# Patient Record
Sex: FEMALE | Race: ASIAN | URBAN - METROPOLITAN AREA
[De-identification: names, ages, dates, MRNs, and addresses within clinical notes are randomized per-mention and may not be internally consistent; named-entity substitution may affect disease eponyms.]

---

## 2017-01-17 ENCOUNTER — HOSPITAL ENCOUNTER (OUTPATIENT)
Dept: LAB | Age: 57
Discharge: HOME | End: 2017-01-17
Payer: OTHER GOVERNMENT

## 2017-01-17 ENCOUNTER — OFFICE VISIT (OUTPATIENT)
Dept: OBGYN CLINIC | Age: 57
End: 2017-01-17

## 2017-01-17 VITALS
DIASTOLIC BLOOD PRESSURE: 70 MMHG | WEIGHT: 123 LBS | HEIGHT: 62 IN | SYSTOLIC BLOOD PRESSURE: 123 MMHG | HEART RATE: 78 BPM | BODY MASS INDEX: 22.63 KG/M2

## 2017-01-17 DIAGNOSIS — Z11.3 ENCOUNTER FOR SCREENING EXAMINATION FOR SEXUALLY TRANSMITTED DISEASE: ICD-10-CM

## 2017-01-17 DIAGNOSIS — Z01.419 WELL WOMAN EXAM: Primary | ICD-10-CM

## 2017-01-17 DIAGNOSIS — Z23 NEED FOR DIPHTHERIA-TETANUS-PERTUSSIS (TDAP) VACCINE: ICD-10-CM

## 2017-01-17 DIAGNOSIS — Z01.419 WELL WOMAN EXAM: ICD-10-CM

## 2017-01-17 DIAGNOSIS — N89.8 VAGINAL DRYNESS: ICD-10-CM

## 2017-01-17 DIAGNOSIS — Z23 ENCOUNTER FOR IMMUNIZATION: ICD-10-CM

## 2017-01-17 DIAGNOSIS — N60.99 ATYPICAL HYPERPLASIA OF BREAST: ICD-10-CM

## 2017-01-17 PROCEDURE — 87389 HIV-1 AG W/HIV-1&-2 AB AG IA: CPT | Performed by: OBSTETRICS & GYNECOLOGY

## 2017-01-17 RX ORDER — AMLODIPINE BESYLATE 2.5 MG/1
2.5 TABLET ORAL DAILY
COMMUNITY

## 2017-01-17 RX ORDER — ROSUVASTATIN CALCIUM 5 MG/1
5 TABLET, COATED ORAL DAILY
COMMUNITY

## 2017-01-17 NOTE — PROGRESS NOTES
Name: Tia Osman MRN: [id number]    YOB: 1960  Age: 55 y.o. Sex: female        Chief Complaint   Patient presents with    Well Woman       HPI  Denies depression  Does eat an ok balanced diet  Does exercise, 30 mins per day  Denies domestic abuse  Last tdap over 10 years ago, thinks in   Flu vaccine done  Mammogram 2016, scheduled for follow up   Colonoscopy scheduled later this month or February    OB History      Para Term  AB TAB SAB Ectopic Multiple Living    2 2 2  0  0   1        Obstetric Comments    Term stillborn    Menopause at 47  No h/o of STIs  Last pap 2016, neg, HR HPV neg             History   Sexual Activity    Sexual activity: Yes    Partners: Male       Past Medical History   Diagnosis Date    Atypical hyperplasia of breast      lump removed from R breast , lump removed from L breast , both atypical hyperplasia    Hypercholesterolemia     Hypertension        Past Surgical History   Procedure Laterality Date    Hx breast biopsy       right and left breast       Allergies   Allergen Reactions    Pcn [Penicillins] Rash       Current Outpatient Prescriptions on File Prior to Visit   Medication Sig Dispense Refill    raloxifene (EVISTA) 60 mg tablet Take  by mouth daily.  PSYLLIUM HUSK/CALCIUM CARB (METAMUCIL PLUS CALCIUM PO) Take  by mouth.  calcium-cholecalciferol, d3, 600-125 mg-unit tab Take  by mouth.  PV W-O JAEL/FERROUS FUMARATE/FA (M-VIT PO) Take  by mouth.  ascorbic acid (VITAMIN C) 500 mg tablet Take  by mouth.  Omega-3-DHA-EPA-Fish Oil 1,000 mg (120 mg-180 mg) cap Take  by mouth. No current facility-administered medications on file prior to visit. Norvasc   Crestor      Social History     Social History    Marital status:      Spouse name: N/A    Number of children: N/A    Years of education: N/A     Occupational History    Not on file.      Social History Main Topics    Smoking status: Former Smoker    Smokeless tobacco: Former User     Quit date: 1/12/1985    Alcohol use No    Drug use: No    Sexual activity: Yes     Partners: Male     Other Topics Concern    Not on file     Social History Narrative       Family History   Problem Relation Age of Onset    Cancer Father      lung    Diabetes Father     Stroke Father     Hypertension Father     Cancer Paternal Grandfather      stomach    Cancer Maternal Aunt      breast cancer in both breasts    Cancer Other      mat cousin breast cancer    Stroke Mother        Review of Systems   Constitutional: Negative. HENT: Negative. Respiratory: Positive for shortness of breath (did PFTs which were normal). Negative for cough. Cardiovascular: Negative. Gastrointestinal: Positive for constipation (extra fiber). Negative for abdominal pain, diarrhea, nausea and vomiting. Genitourinary: Negative. Musculoskeletal: Positive for back pain (seasonal arthritis, only notes with weather changes). Negative for neck pain. Skin: Negative. Neurological: Negative. Visit Vitals    /70 (BP 1 Location: Left arm, BP Patient Position: Sitting)    Pulse 78    Ht 5' 2\" (1.575 m)    Wt 123 lb (55.8 kg)    BMI 22.5 kg/m2       GENERAL:  Well developed, well nourished, in no distress  NEURO/PSYCHE: Grossly intact, normal mood and affect  HEENT: Normal cephalic, atraumatic, good dentition, neck supple.  No thyromegaly  BREASTS: breasts appear normal, no suspicious masses, no skin or nipple changes, scars noted from biopsies   CV: regular rate and rhythm  LUNGS: clear to auscultation bilaterally, no wheezes, rhonchi or rales, good air entry with normal effort  ABDOMEN: + BS, soft without tenderness, no guarding, rebound or masses  EXTREMITIES: no edema or erythema noted  SKIN:  Warm, dry, no lesions  LYMPHATICS: No supraclavicular, axillary or inguinal nodes noted    PELVIC EXAM:  LABIA MAJORA: no masses, tenderness or lesions  LABIA MINORA: no masses, tenderness or lesions  CLITORIS: no masses, tenderness or lesions  URETHRA: normal appearing, no masses or tenderness  BLADDER: no fullness or tenderness  VAGINA: pale pink appearing vagina with physiologic discharge, no lesions   PERINEUM: no masses, tenderness or lesions  CERVIX: No CMT or lesions  UTERUS: small, mobile, nontender  ADNEXA: nontender and no masses    1. Well woman exam  Reviewed diet, weight, exercise, and domestic abuse. Mammogram already ordered, colonoscopy getting scheduled. Reviewed tetanus and flu vaccines. All of her questions were discussed. - HIV 1/2 AG/AB, 4TH GENERATION,W RFLX CONFIRM; Future    2. Vaginal dryness  No estrogen due to her breast problems, discussed using buddy's vaginal suppositories as well as other lubricants, all of her questions were answered. 3. Atypical hyperplasia of breast  Has follow up, seeing her oncologist later this month    4. Encounter for screening examination for sexually transmitted disease  Would like testing due to recent surgery    - HIV 1/2 AG/AB, 4TH GENERATION,W RFLX CONFIRM; Future    5.  Need for tetanus vaccine  Will give tetanus today    F/U PRN

## 2017-01-17 NOTE — PATIENT INSTRUCTIONS
Well Visit, Women 48 to 72: Care Instructions  Your Care Instructions  Physical exams can help you stay healthy. Your doctor has checked your overall health and may have suggested ways to take good care of yourself. He or she also may have recommended tests. At home, you can help prevent illness with healthy eating, regular exercise, and other steps. Follow-up care is a key part of your treatment and safety. Be sure to make and go to all appointments, and call your doctor if you are having problems. It's also a good idea to know your test results and keep a list of the medicines you take. How can you care for yourself at home? · Reach and stay at a healthy weight. This will lower your risk for many problems, such as obesity, diabetes, heart disease, and high blood pressure. · Get at least 30 minutes of exercise on most days of the week. Walking is a good choice. You also may want to do other activities, such as running, swimming, cycling, or playing tennis or team sports. · Do not smoke. Smoking can make health problems worse. If you need help quitting, talk to your doctor about stop-smoking programs and medicines. These can increase your chances of quitting for good. · Protect your skin from too much sun. When you're outdoors from 10 a.m. to 4 p.m., stay in the shade or cover up with clothing and a hat with a wide brim. Wear sunglasses that block UV rays. Even when it's cloudy, put broad-spectrum sunscreen (SPF 30 or higher) on any exposed skin. · See a dentist one or two times a year for checkups and to have your teeth cleaned. · Wear a seat belt in the car. · Limit alcohol to 1 drink a day. Too much alcohol can cause health problems. Follow your doctor's advice about when to have certain tests. These tests can spot problems early. · Cholesterol.  Your doctor will tell you how often to have this done based on your age, family history, or other things that can increase your risk for heart attack and stroke. · Blood pressure. Have your blood pressure checked during a routine doctor visit. Your doctor will tell you how often to check your blood pressure based on your age, your blood pressure results, and other factors. · Mammogram. Ask your doctor how often you should have a mammogram, which is an X-ray of your breasts. A mammogram can spot breast cancer before it can be felt and when it is easiest to treat. · Pap test and pelvic exam. Ask your doctor how often you should have a Pap test. You may not need to have a Pap test as often as you used to. · Vision. Have your eyes checked every year or two or as often as your doctor suggests. Some experts recommend that you have yearly exams for glaucoma and other age-related eye problems starting at age 48. · Hearing. Tell your doctor if you notice any change in your hearing. You can have tests to find out how well you hear. · Diabetes. Ask your doctor whether you should have tests for diabetes. · Colon cancer. You should begin tests for colon cancer at age 48. You may have one of several tests. Your doctor will tell you how often to have tests based on your age and risk. Risks include whether you already had a precancerous polyp removed from your colon or whether your parents, sisters and brothers, or children have had colon cancer. · Thyroid disease. Talk to your doctor about whether to have your thyroid checked as part of a regular physical exam. Women have an increased chance of a thyroid problem. · Osteoporosis. You should begin tests for bone density at age 72. If you are younger than 72, ask your doctor whether you have factors that may increase your risk for this disease. You may want to have this test before age 72. · Heart attack and stroke risk. At least every 4 to 6 years, you should have your risk for heart attack and stroke assessed.  Your doctor uses factors such as your age, blood pressure, cholesterol, and whether you smoke or have diabetes to show what your risk for a heart attack or stroke is over the next 10 years. When should you call for help? Watch closely for changes in your health, and be sure to contact your doctor if you have any problems or symptoms that concern you. Where can you learn more? Go to http://bimal-froy.info/. Enter E225 in the search box to learn more about \"Well Visit, Women 50 to 72: Care Instructions. \"  Current as of: July 19, 2016  Content Version: 11.1  © 7958-7481 [employer]. Care instructions adapted under license by [employer] (which disclaims liability or warranty for this information). If you have questions about a medical condition or this instruction, always ask your healthcare professional. [address] any warranty or liability for your use of this information. Td (Tetanus, Diphtheria) Vaccine: What You Need to Know  Why get vaccinated? Tetanus and diphtheria are very serious diseases. They are rare in the [place] today, but people who do become infected often have severe complications. Td vaccine is used to protect adolescents and adults from both of these diseases. Both diphtheria and tetanus are infections caused by bacteria. Diphtheria spreads from person to person through secretions from coughing or sneezing. Tetanus-causing bacteria enter the body through cuts, scratches, or wounds. TETANUS (lockjaw) causes painful muscle tightening and stiffness, usually all over the body. · It can lead to tightening of muscles in the head and neck so you can't open your mouth, swallow, or sometimes even breathe. Tetanus kills about 1 out of every 10 people who are infected even after receiving the best medical care. DIPHTHERIA can cause a thick coating to form in the back of the throat. · It can lead to breathing problems, heart failure, paralysis, and death.   Before vaccines, as many as 200,000 cases of diphtheria and hundreds of cases of tetanus were reported in the [place] each year. Since vaccination began, reports of cases for both diseases have dropped by about 99%. Td vaccine  Td vaccine can protect adolescents and adults from tetanus and diphtheria. Td is usually given as a booster dose every 10 years, but it can also be given earlier after a severe and dirty wound or burn. Another vaccine, called Tdap, which protects against pertussis in addition to tetanus and diphtheria, is sometimes recommended instead of Td vaccine. Your doctor or the person giving you the vaccine can give you more information. Td may safely be given at the same time as other vaccines. Some people should not get this vaccine  · A person who has ever had a life-threatening allergic reaction after a previous dose of any tetanus- or diphtheria-containing vaccine, OR has a severe allergy to any part of this vaccine, should not get Td vaccine. Tell the person giving the vaccine about any severe allergies. · Talk to your doctor if you:  ¨ Have seizures or another nervous system problem. ¨ Had severe pain or swelling after any vaccine containing diphtheria or tetanus. ¨ Ever had a condition called Guillain Barré Syndrome (GBS). ¨ Aren't feeling well on the day the shot is scheduled. Risks of a vaccine reaction  With any medicine, including vaccines, there is a chance of side effects. These are usually mild and go away on their own. Serious reactions are also possible but are rare. Most people who get Td vaccine do not have any problems with it.   Mild problems, following Td vaccine  (Did not interfere with activities)  · Pain where the shot was given (about 8 people in 10)  · Redness or swelling where the shot was given (about 1 person in 4)  · Mild fever (rare)  · Headache (about 1 person in 4)  · Tiredness (about 1 person in 4)  Moderate problems, following Td vaccine  (Interfered with activities, but did not require medical attention)  · Fever over 102°F (rare)  Severe problems, following Td vaccine  (Unable to perform usual activities; required medical attention)  · Swelling, severe pain, bleeding, and/or redness in the arm where the shot was given (rare)  Problems that could happen after any vaccine:  · People sometimes faint after a medical procedure, including vaccination. Sitting or lying down for about 15 minutes can help prevent fainting, and injuries caused by a fall. Tell your doctor if you feel dizzy or have vision changes or ringing in the ears. · Some people get severe pain in the shoulder and have difficulty moving the arm where a shot was given. This happens very rarely. · Any medication can cause a severe allergic reaction. Such reactions from a vaccine are very rare, estimated at fewer than 1 in a million doses, and would happen within a few minutes to a few hours after the vaccination. As with any medicine, there is a very remote chance of a vaccine causing a serious injury or death. The safety of vaccines is always being monitored. For more information, visit: www.cdc.gov/vaccinesafety. What if there is a serious reaction? What should I look for? · Look for anything that concerns you, such as signs of a severe allergic reaction, very high fever, or unusual behavior. Signs of a severe allergic reaction can include hives, swelling of the face and throat, difficulty breathing, a fast heartbeat, dizziness, and weakness. These would usually start a few minutes to a few hours after the vaccination. What should I do? · If you think it is a severe allergic reaction or other emergency that can't wait, call 9-1-1 or get the person to the nearest hospital. Otherwise, call your doctor. · Afterward, the reaction should be reported to the Vaccine Adverse Event Reporting System (VAERS). Your doctor might file this report, or you can do it yourself through the VAERS web site at www.vaers. hhs.gov, or by calling 9-588.704.8181.   [employer] does not give medical advice. The National Vaccine Injury Compensation Program  The National Vaccine Injury Compensation Program (VICP) is a federal program that was created to compensate people who may have been injured by certain vaccines. Persons who believe they may have been injured by a vaccine can learn about the program and about filing a claim by calling 1-876.387.2822 or visiting the [employer] website at www.[employer] [facility].gov/vaccinecompensation. There is a time limit to file a claim for compensation. How can I learn more? · Ask your doctor. He or she can give you the vaccine package insert or suggest other sources of information. · Call your local or state health department. · Contact the Centers for Disease Control and Prevention (CDC):  ¨ Call 1-985.479.7071 (1-800-CDC-INFO) or  ¨ Visit CDC's website at www.cdc.gov/vaccines  Vaccine Information Statement (Interim)  Td Vaccine  (2/24/2015)  42 AARON Rogel [id number]  Department of Health and Human Services  Centers for Disease Control and Prevention  Many Vaccine Information Statements are available in Welsh and other languages. See www.immunize.org/vis. Muchas hojas de información sobre vacunas están disponibles en español y en otros idiomas. Visite www.immunize.org/vis. Care instructions adapted under license by your healthcare professional. If you have questions about a medical condition or this instruction, always ask your healthcare professional. [place] [address] any warranty or liability for your use of this information. Breast Cancer (BRCA) Gene Testing: Care Instructions  Your Care Instructions  BRCA1 and BRCA2 are genes that help control normal cell growth. Sometimes, people inherit changes in one of these genes. These changes are called mutations. If you inherit a BRCA (say \"BRAH-kuh\") mutation, you have a greater risk of breast or ovarian cancer. You also have a higher risk of breast or ovarian cancer if you have a family history of them. Some women have a family history, but they don't have the BRCA mutation. To find out if you have the BRCA mutation, you can have a blood test. People who have the mutation have a higher risk for these cancers. But not everyone with the mutation gets cancer. Talk to your doctor if:  · You have a close family member who had a positive test for BRCA. · You or a family member has had breast cancer before age 48. · You or a family member has had ovarian cancer at any age. · You had breast cancer in both breasts or in many places in the same breast.  · You or a family member has both breast and ovarian cancer. · You are [place] [address] and someone in your family had breast cancer before age 48 or ovarian cancer at any age. Your doctor may also want you to talk with a genetic counselor. The counselor can help you understand what the results of this test might mean. Follow-up care is a key part of your treatment and safety. Be sure to make and go to all appointments, and call your doctor if you are having problems. It's also a good idea to know your test results and keep a list of the medicines you take. Why should you have BRCA testing? You may feel better if the test shows that you don't have a BRCA mutation. This is called a negative result. If the test shows that you do have a BRCA mutation, it's called a positive result. In this case, you may be able to make some decisions that could reduce your cancer risk. The information may also be helpful to your family and loved ones. What are the risks of BRCA testing? A negative test may give you a false sense of security. So you may not have the regular tests that help find cancer at an early stage. But a negative BRCA test does not mean that you will never have breast or ovarian cancer. A positive test result may cause anxiety or depression. A positive BRCA test does not mean that you will definitely get breast or ovarian cancer.   It's important to think carefully about what the test results could mean for you. A genetic counselor can help you do this. What can you do to reduce the risk of breast cancer? All women have a risk of breast cancer. This risk increases as you get older. There is no known way to prevent breast cancer. But it can usually be cured if it's found early. You can reduce your risk if you take these steps:  · Get familiar with the look and feel of your breasts. This will help you notice any changes. Call your doctor if you notice a change. · Have regular breast exams by your doctor or nurse. Ask your doctor how often you should get them. · Have regular mammograms. A mammogram is a picture of your breast tissue. It can find changes in your breast before you can feel them. Talk to your doctor about when to get this test.  You can also help take care of yourself and reduce your risk of cancer if you:  · Stay at a healthy weight. · Eat a healthy, low-fat diet. · Get some exercise every day. If you don't usually exercise, walking is a good way to start. · Don't smoke. If you need help quitting, talk to your doctor about stop-smoking programs and medicines. These can increase your chances of quitting for good. · Drink alcohol in moderation. Or don't drink alcohol at all. · Breastfeed. There is some evidence that breastfeeding may lower the risk of breast cancer. The benefit seems to be greatest in women who have  for longer than 12 months or who  several children. Where can you learn more? Go to http://bimal-froy.info/. Enter U252 in the search box to learn more about \"Breast Cancer (BRCA) Gene Testing: Care Instructions. \"  Current as of: July 26, 2016  Content Version: 11.1  © 8529-2995 [employer]. Care instructions adapted under license by [employer] (which disclaims liability or warranty for this information).  If you have questions about a medical condition or this instruction, always ask your healthcare professional. [place] [address] any warranty or liability for your use of this information.

## 2017-01-17 NOTE — MR AVS SNAPSHOT
Visit Information Date & Time Provider Department Dept. Phone Encounter #  
 1/17/2017 11:30 AM Delton Lesch, [place] [id number] Follow-up Instructions Return as needed. Upcoming Health Maintenance Date Due  
 BREAST CANCER SCRN MAMMOGRAM 11/20/2011 FOBT Q 1 YEAR AGE 50-75 11/20/2011 INFLUENZA AGE 9 TO ADULT 8/1/2016 PAP AKA CERVICAL CYTOLOGY 1/12/2019 Allergies as of 1/17/2017  Review Complete On: 1/17/2017 By: Delton Lesch, MD  
  
 Severity Noted Reaction Type Reactions Pcn [Penicillins]  01/12/2016    Rash Current Immunizations  Never Reviewed No immunizations on file. Not reviewed this visit You Were Diagnosed With   
  
 Codes Comments Well woman exam    -  Primary ICD-10-CM: Y16.412 ICD-9-CM: V72.31 Vaginal dryness     ICD-10-CM: N89.8 ICD-9-CM: 625.8 Atypical hyperplasia of breast     ICD-10-CM: N62 
ICD-9-CM: 611.1 Encounter for screening examination for sexually transmitted disease     ICD-10-CM: Z11.3 ICD-9-CM: V74.5 Need for diphtheria-tetanus-pertussis (Tdap) vaccine     ICD-10-CM: F62 ICD-9-CM: V06.1 Vitals BP Pulse Height(growth percentile) Weight(growth percentile) BMI OB Status 123/70 (BP 1 Location: Left arm, BP Patient Position: Sitting) 78 5' 2\" (1.575 m) 123 lb (55.8 kg) 22.5 kg/m2 Postmenopausal  
 Smoking Status Former Smoker BMI and BSA Data Body Mass Index Body Surface Area  
 22.5 kg/m 2 1.56 m 2 Your Updated Medication List  
  
   
This list is accurate as of: 1/17/17 11:40 AM.  Always use your most recent med list. amLODIPine 2.5 mg tablet Commonly known as:  Petra Spruce Take 2.5 mg by mouth daily. ascorbic acid (vitamin C) 500 mg tablet Commonly known as:  VITAMIN C Take  by mouth.  
  
 calcium-cholecalciferol (d3) 600-125 mg-unit Tab Take  by mouth. CRESTOR 5 mg tablet Generic drug:  rosuvastatin Take 5 mg by mouth daily. M-VIT PO Take  by mouth. METAMUCIL PLUS CALCIUM PO Take  by mouth. Omega-3-DHA-EPA-Fish Oil 1,000 mg (120 mg-180 mg) Cap Take  by mouth. raloxifene 60 mg tablet Commonly known as:  EVISTA Take  by mouth daily. Follow-up Instructions Return as needed. To-Do List   
 01/17/2017 Lab:  HIV 1/2 AG/AB, 4TH GENERATION,W RFLX CONFIRM Patient Instructions Well Visit, Women 48 to 72: Care Instructions Your Care Instructions Physical exams can help you stay healthy. Your doctor has checked your overall health and may have suggested ways to take good care of yourself. He or she also may have recommended tests. At home, you can help prevent illness with healthy eating, regular exercise, and other steps. Follow-up care is a key part of your treatment and safety. Be sure to make and go to all appointments, and call your doctor if you are having problems. It's also a good idea to know your test results and keep a list of the medicines you take. How can you care for yourself at home? · Reach and stay at a healthy weight. This will lower your risk for many problems, such as obesity, diabetes, heart disease, and high blood pressure. · Get at least 30 minutes of exercise on most days of the week. Walking is a good choice. You also may want to do other activities, such as running, swimming, cycling, or playing tennis or team sports. · Do not smoke. Smoking can make health problems worse. If you need help quitting, talk to your doctor about stop-smoking programs and medicines. These can increase your chances of quitting for good. · Protect your skin from too much sun. When you're outdoors from 10 a.m. to 4 p.m., stay in the shade or cover up with clothing and a hat with a wide brim. Wear sunglasses that block UV rays.  Even when it's cloudy, put broad-spectrum sunscreen (SPF 30 or higher) on any exposed skin. · See a dentist one or two times a year for checkups and to have your teeth cleaned. · Wear a seat belt in the car. · Limit alcohol to 1 drink a day. Too much alcohol can cause health problems. Follow your doctor's advice about when to have certain tests. These tests can spot problems early. · Cholesterol. Your doctor will tell you how often to have this done based on your age, family history, or other things that can increase your risk for heart attack and stroke. · Blood pressure. Have your blood pressure checked during a routine doctor visit. Your doctor will tell you how often to check your blood pressure based on your age, your blood pressure results, and other factors. · Mammogram. Ask your doctor how often you should have a mammogram, which is an X-ray of your breasts. A mammogram can spot breast cancer before it can be felt and when it is easiest to treat. · Pap test and pelvic exam. Ask your doctor how often you should have a Pap test. You may not need to have a Pap test as often as you used to. · Vision. Have your eyes checked every year or two or as often as your doctor suggests. Some experts recommend that you have yearly exams for glaucoma and other age-related eye problems starting at age 48. · Hearing. Tell your doctor if you notice any change in your hearing. You can have tests to find out how well you hear. · Diabetes. Ask your doctor whether you should have tests for diabetes. · Colon cancer. You should begin tests for colon cancer at age 48. You may have one of several tests. Your doctor will tell you how often to have tests based on your age and risk. Risks include whether you already had a precancerous polyp removed from your colon or whether your parents, sisters and brothers, or children have had colon cancer. · Thyroid disease.  Talk to your doctor about whether to have your thyroid checked as part of a regular physical exam. Women have an increased chance of a thyroid problem. · Osteoporosis. You should begin tests for bone density at age 72. If you are younger than 72, ask your doctor whether you have factors that may increase your risk for this disease. You may want to have this test before age 72. · Heart attack and stroke risk. At least every 4 to 6 years, you should have your risk for heart attack and stroke assessed. Your doctor uses factors such as your age, blood pressure, cholesterol, and whether you smoke or have diabetes to show what your risk for a heart attack or stroke is over the next 10 years. When should you call for help? Watch closely for changes in your health, and be sure to contact your doctor if you have any problems or symptoms that concern you. Where can you learn more? Go to http://bimal-froy.info/. Enter F792 in the search box to learn more about \"Well Visit, Women 50 to 72: Care Instructions. \" Current as of: July 19, 2016 Content Version: 11.1 © 4118-4166 [employer]. Care instructions adapted under license by [employer] (which disclaims liability or warranty for this information). If you have questions about a medical condition or this instruction, always ask your healthcare professional. [place] [address] any warranty or liability for your use of this information. Td (Tetanus, Diphtheria) Vaccine: What You Need to Know Why get vaccinated? Tetanus and diphtheria are very serious diseases. They are rare in the [place] today, but people who do become infected often have severe complications. Td vaccine is used to protect adolescents and adults from both of these diseases. Both diphtheria and tetanus are infections caused by bacteria.  Diphtheria spreads from person to person through secretions from coughing or sneezing. Tetanus-causing bacteria enter the body through cuts, scratches, or wounds. TETANUS (lockjaw) causes painful muscle tightening and stiffness, usually all over the body. · It can lead to tightening of muscles in the head and neck so you can't open your mouth, swallow, or sometimes even breathe. Tetanus kills about 1 out of every 10 people who are infected even after receiving the best medical care. DIPHTHERIA can cause a thick coating to form in the back of the throat. · It can lead to breathing problems, heart failure, paralysis, and death. Before vaccines, as many as 200,000 cases of diphtheria and hundreds of cases of tetanus were reported in the [place] each year. Since vaccination began, reports of cases for both diseases have dropped by about 99%. Td vaccine Td vaccine can protect adolescents and adults from tetanus and diphtheria. Td is usually given as a booster dose every 10 years, but it can also be given earlier after a severe and dirty wound or burn. Another vaccine, called Tdap, which protects against pertussis in addition to tetanus and diphtheria, is sometimes recommended instead of Td vaccine. Your doctor or the person giving you the vaccine can give you more information. Td may safely be given at the same time as other vaccines. Some people should not get this vaccine · A person who has ever had a life-threatening allergic reaction after a previous dose of any tetanus- or diphtheria-containing vaccine, OR has a severe allergy to any part of this vaccine, should not get Td vaccine. Tell the person giving the vaccine about any severe allergies. · Talk to your doctor if you: 
¨ Have seizures or another nervous system problem. ¨ Had severe pain or swelling after any vaccine containing diphtheria or tetanus. ¨ Ever had a condition called Guillain Barré Syndrome (GBS). ¨ Aren't feeling well on the day the shot is scheduled. Risks of a vaccine reaction With any medicine, including vaccines, there is a chance of side effects. These are usually mild and go away on their own. Serious reactions are also possible but are rare. Most people who get Td vaccine do not have any problems with it. Mild problems, following Td vaccine 
(Did not interfere with activities) · Pain where the shot was given (about 8 people in 10) · Redness or swelling where the shot was given (about 1 person in 4) · Mild fever (rare) · Headache (about 1 person in 4) · Tiredness (about 1 person in 4) Moderate problems, following Td vaccine (Interfered with activities, but did not require medical attention) · Fever over 102°F (rare) Severe problems, following Td vaccine 
(Unable to perform usual activities; required medical attention) · Swelling, severe pain, bleeding, and/or redness in the arm where the shot was given (rare) Problems that could happen after any vaccine: · People sometimes faint after a medical procedure, including vaccination. Sitting or lying down for about 15 minutes can help prevent fainting, and injuries caused by a fall. Tell your doctor if you feel dizzy or have vision changes or ringing in the ears. · Some people get severe pain in the shoulder and have difficulty moving the arm where a shot was given. This happens very rarely. · Any medication can cause a severe allergic reaction. Such reactions from a vaccine are very rare, estimated at fewer than 1 in a million doses, and would happen within a few minutes to a few hours after the vaccination. As with any medicine, there is a very remote chance of a vaccine causing a serious injury or death. The safety of vaccines is always being monitored. For more information, visit: www.cdc.gov/vaccinesafety. What if there is a serious reaction? What should I look for? · Look for anything that concerns you, such as signs of a severe allergic reaction, very high fever, or unusual behavior. Signs of a severe allergic reaction can include hives, swelling of the face and throat, difficulty breathing, a fast heartbeat, dizziness, and weakness. These would usually start a few minutes to a few hours after the vaccination. What should I do? · If you think it is a severe allergic reaction or other emergency that can't wait, call 9-1-1 or get the person to the nearest hospital. Otherwise, call your doctor. · Afterward, the reaction should be reported to the Vaccine Adverse Event Reporting System (VAERS). Your doctor might file this report, or you can do it yourself through the VAERS web site at www.vaers. [facility].gov, or by calling 9-278.854.7016. VAERS does not give medical advice. The National Vaccine Injury Compensation Program 
The National Vaccine Injury Compensation Program (VICP) is a federal program that was created to compensate people who may have been injured by certain vaccines. Persons who believe they may have been injured by a vaccine can learn about the program and about filing a claim by calling 6-217.691.6500 or visiting the [facility] website at www.[facility].gov/vaccinecompensation. There is a time limit to file a claim for compensation. How can I learn more? · Ask your doctor. He or she can give you the vaccine package insert or suggest other sources of information. · Call your local or state health department. · Contact the Centers for Disease Control and Prevention (CDC): 
¨ Call 2-715.934.6346 (1-800-CDC-INFO) or ¨ Visit CDC's website at www.cdc.gov/vaccines Vaccine Information Statement (Interim) Td Vaccine 
(2/24/2015) 42 AARON HayTrell Romeok 791OV-12 Department of Health and [place] Centers for Disease Control and Prevention Many Vaccine Information Statements are available in [place] and other languages. See www.immunize.org/vis. Muchas hojas de información sobre vacunas están disponibles en español y en otros idiomas. Visite www.immunize.org/vis. Care instructions adapted under license by your healthcare professional. If you have questions about a medical condition or this instruction, always ask your healthcare professional. [address] any warranty or liability for your use of this information. Breast Cancer (BRCA) Gene Testing: Care Instructions Your Care Instructions BRCA1 and BRCA2 are genes that help control normal cell growth. Sometimes, people inherit changes in one of these genes. These changes are called mutations. If you inherit a BRCA (say \"BRAH-kuh\") mutation, you have a greater risk of breast or ovarian cancer. You also have a higher risk of breast or ovarian cancer if you have a family history of them. Some women have a family history, but they don't have the BRCA mutation. To find out if you have the BRCA mutation, you can have a blood test. People who have the mutation have a higher risk for these cancers. But not everyone with the mutation gets cancer. Talk to your doctor if: 
· You have a close family member who had a positive test for BRCA. · You or a family member has had breast cancer before age 48. · You or a family member has had ovarian cancer at any age. · You had breast cancer in both breasts or in many places in the same breast. 
· You or a family member has both breast and ovarian cancer. · You are of [place] [address] and someone in your family had breast cancer before age 48 or ovarian cancer at any age. Your doctor may also want you to talk with a genetic counselor. The counselor can help you understand what the results of this test might mean. Follow-up care is a key part of your treatment and safety. Be sure to make and go to all appointments, and call your doctor if you are having problems. It's also a good idea to know your test results and keep a list of the medicines you take. Why should you have BRCA testing? You may feel better if the test shows that you don't have a BRCA mutation. This is called a negative result. If the test shows that you do have a BRCA mutation, it's called a positive result. In this case, you may be able to make some decisions that could reduce your cancer risk. The information may also be helpful to your family and loved ones. What are the risks of BRCA testing? A negative test may give you a false sense of security. So you may not have the regular tests that help find cancer at an early stage. But a negative BRCA test does not mean that you will never have breast or ovarian cancer. A positive test result may cause anxiety or depression. A positive BRCA test does not mean that you will definitely get breast or ovarian cancer. It's important to think carefully about what the test results could mean for you. A genetic counselor can help you do this. What can you do to reduce the risk of breast cancer? All women have a risk of breast cancer. This risk increases as you get older. There is no known way to prevent breast cancer. But it can usually be cured if it's found early. You can reduce your risk if you take these steps: · Get familiar with the look and feel of your breasts. This will help you notice any changes. Call your doctor if you notice a change. · Have regular breast exams by your doctor or nurse. Ask your doctor how often you should get them. · Have regular mammograms. A mammogram is a picture of your breast tissue. It can find changes in your breast before you can feel them. Talk to your doctor about when to get this test. 
You can also help take care of yourself and reduce your risk of cancer if you: 
· Stay at a healthy weight. · Eat a healthy, low-fat diet. · Get some exercise every day. If you don't usually exercise, walking is a good way to start. · Don't smoke.  If you need help quitting, talk to your doctor about stop-smoking programs and medicines. These can increase your chances of quitting for good. · Drink alcohol in moderation. Or don't drink alcohol at all. · Breastfeed. There is some evidence that breastfeeding may lower the risk of breast cancer. The benefit seems to be greatest in women who have  for longer than 12 months or who  several children. Where can you learn more? Go to http://bimal-froy.info/. Enter U252 in the search box to learn more about \"Breast Cancer (BRCA) Gene Testing: Care Instructions. \" Current as of: July 26, 2016 Content Version: 11.1 © 9554-4440 [employer]. Care instructions adapted under license by [employer] (which disclaims liability or warranty for this information). If you have questions about a medical condition or this instruction, always ask your healthcare professional. [address] any warranty or liability for your use of this information. Introducing [facility] HEALTH SERVICES! Dear Suzanne Garcia: Thank you for requesting a [facility] account. Our records indicate that you already have an active [facility] [facility] account. You can access your account anytime at [email or web address] Did you know that you can access your hospital and ER discharge instructions at any time in [facility] [facility]? You can also review all of your test results from your hospital stay or ER visit. Additional Information If you have questions, please visit the Frequently Asked Questions section of the [employer] website at [email or web address] Remember, [facility] is NOT to be used for urgent needs. For medical emergencies, dial 911. Now available from your iPhone and Android! Please provide this summary of care documentation to your next provider. If you have any questions after today's visit, please call [phone].

## 2017-01-18 LAB — HIV 1+2 AB+HIV1 P24 AG SERPL QL IA: NON REACTIVE

## 2017-01-19 ENCOUNTER — TELEPHONE (OUTPATIENT)
Dept: OBGYN CLINIC | Age: 57
End: 2017-01-19

## 2017-01-19 NOTE — TELEPHONE ENCOUNTER
----- Message from Charlee Zuñiga MD sent at 1/18/2017 10:24 AM EST -----  Please let pt know results above are neg, thanks!

## 2018-01-02 ENCOUNTER — IMPORTED ENCOUNTER (OUTPATIENT)
Dept: URBAN - METROPOLITAN AREA CLINIC 1 | Facility: CLINIC | Age: 58
End: 2018-01-02

## 2018-01-02 PROCEDURE — 92015 DETERMINE REFRACTIVE STATE: CPT

## 2018-01-02 PROCEDURE — 92004 COMPRE OPH EXAM NEW PT 1/>: CPT

## 2018-01-02 NOTE — PATIENT DISCUSSION
1.  Blepharitis OD - Daily warm compresses and lid scrubs were recommended. Tobradex ajay to lids OD BID 2. Dry Eyes OU -The use/continuation of artificial tears were recommended. 3.  Return for an appointment in 1 week for 10 with Dr. Gordon Henry.

## 2018-01-03 PROBLEM — H01.001: Noted: 2018-01-03

## 2018-01-03 PROBLEM — H01.004: Noted: 2018-01-03

## 2018-01-03 PROBLEM — H04.123: Noted: 2018-01-03

## 2018-03-08 ENCOUNTER — OFFICE VISIT (OUTPATIENT)
Dept: OBGYN CLINIC | Age: 58
End: 2018-03-08

## 2018-03-08 VITALS
WEIGHT: 123 LBS | DIASTOLIC BLOOD PRESSURE: 67 MMHG | TEMPERATURE: 98.8 F | OXYGEN SATURATION: 98 % | HEIGHT: 62 IN | HEART RATE: 90 BPM | SYSTOLIC BLOOD PRESSURE: 127 MMHG | BODY MASS INDEX: 22.63 KG/M2

## 2018-03-08 DIAGNOSIS — N89.8 VAGINAL DRYNESS: ICD-10-CM

## 2018-03-08 DIAGNOSIS — Z01.419 WELL WOMAN EXAM WITH ROUTINE GYNECOLOGICAL EXAM: Primary | ICD-10-CM

## 2018-03-08 DIAGNOSIS — N60.91 ATYPICAL HYPERPLASIA OF BOTH BREASTS: ICD-10-CM

## 2018-03-08 DIAGNOSIS — N60.92 ATYPICAL HYPERPLASIA OF BOTH BREASTS: ICD-10-CM
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
## 2018-03-08 NOTE — PATIENT INSTRUCTIONS
Well Visit, Women 48 to 72: Care Instructions  Your Care Instructions    Physical exams can help you stay healthy. Your doctor has checked your overall health and may have suggested ways to take good care of yourself. He or she also may have recommended tests. At home, you can help prevent illness with healthy eating, regular exercise, and other steps. Follow-up care is a key part of your treatment and safety. Be sure to make and go to all appointments, and call your doctor if you are having problems. It's also a good idea to know your test results and keep a list of the medicines you take. How can you care for yourself at home? · Reach and stay at a healthy weight. This will lower your risk for many problems, such as obesity, diabetes, heart disease, and high blood pressure. · Get at least 30 minutes of exercise on most days of the week. Walking is a good choice. You also may want to do other activities, such as running, swimming, cycling, or playing tennis or team sports. · Do not smoke. Smoking can make health problems worse. If you need help quitting, talk to your doctor about stop-smoking programs and medicines. These can increase your chances of quitting for good. · Protect your skin from too much sun. When you're outdoors from 10 a.m. to 4 p.m., stay in the shade or cover up with clothing and a hat with a wide brim. Wear sunglasses that block UV rays. Even when it's cloudy, put broad-spectrum sunscreen (SPF 30 or higher) on any exposed skin. · See a dentist one or two times a year for checkups and to have your teeth cleaned. · Wear a seat belt in the car. · Limit alcohol to 1 drink a day. Too much alcohol can cause health problems. Follow your doctor's advice about when to have certain tests. These tests can spot problems early. · Cholesterol.  Your doctor will tell you how often to have this done based on your age, family history, or other things that can increase your risk for heart attack and stroke. · Blood pressure. Have your blood pressure checked during a routine doctor visit. Your doctor will tell you how often to check your blood pressure based on your age, your blood pressure results, and other factors. · Mammogram. Ask your doctor how often you should have a mammogram, which is an X-ray of your breasts. A mammogram can spot breast cancer before it can be felt and when it is easiest to treat. · Pap test and pelvic exam. Ask your doctor how often you should have a Pap test. You may not need to have a Pap test as often as you used to. · Vision. Have your eyes checked every year or two or as often as your doctor suggests. Some experts recommend that you have yearly exams for glaucoma and other age-related eye problems starting at age 48. · Hearing. Tell your doctor if you notice any change in your hearing. You can have tests to find out how well you hear. · Diabetes. Ask your doctor whether you should have tests for diabetes. · Colon cancer. You should begin tests for colon cancer at age 48. You may have one of several tests. Your doctor will tell you how often to have tests based on your age and risk. Risks include whether you already had a precancerous polyp removed from your colon or whether your parents, sisters and brothers, or children have had colon cancer. · Thyroid disease. Talk to your doctor about whether to have your thyroid checked as part of a regular physical exam. Women have an increased chance of a thyroid problem. · Osteoporosis. You should begin tests for bone density at age 72. If you are younger than 72, ask your doctor whether you have factors that may increase your risk for this disease. You may want to have this test before age 72. · Heart attack and stroke risk. At least every 4 to 6 years, you should have your risk for heart attack and stroke assessed.  Your doctor uses factors such as your age, blood pressure, cholesterol, and whether you smoke or have diabetes to show what your risk for a heart attack or stroke is over the next 10 years. When should you call for help? Watch closely for changes in your health, and be sure to contact your doctor if you have any problems or symptoms that concern you. Where can you learn more? Go to http://bimal-froy.info/. Enter R911 in the search box to learn more about \"Well Visit, Women 50 to 72: Care Instructions. \"  Current as of: May 12, 2017  Content Version: 11.4  © 5685-2096 [employer]. Care instructions adapted under license by [employer] (which disclaims liability or warranty for this information). If you have questions about a medical condition or this instruction, always ask your healthcare professional. [address] any warranty or liability for your use of this information.

## 2019-04-11 ENCOUNTER — HOSPITAL ENCOUNTER (OUTPATIENT)
Dept: LAB | Age: 59
Discharge: HOME | End: 2019-04-11
Payer: OTHER GOVERNMENT

## 2019-04-11 ENCOUNTER — OFFICE VISIT (OUTPATIENT)
Dept: OBGYN CLINIC | Age: 59
End: 2019-04-11

## 2019-04-11 VITALS
SYSTOLIC BLOOD PRESSURE: 136 MMHG | HEIGHT: 62 IN | BODY MASS INDEX: 22.26 KG/M2 | OXYGEN SATURATION: 98 % | DIASTOLIC BLOOD PRESSURE: 76 MMHG | HEART RATE: 98 BPM | WEIGHT: 121 LBS | TEMPERATURE: 98.5 F

## 2019-04-11 DIAGNOSIS — Z12.4 SCREENING FOR MALIGNANT NEOPLASM OF CERVIX: ICD-10-CM

## 2019-04-11 DIAGNOSIS — N95.0 PMB (POSTMENOPAUSAL BLEEDING): ICD-10-CM

## 2019-04-11 DIAGNOSIS — Z01.419 WELL WOMAN EXAM WITH ROUTINE GYNECOLOGICAL EXAM: Primary | ICD-10-CM

## 2019-04-11 PROCEDURE — 87491 CHLMYD TRACH DNA AMP PROBE: CPT

## 2019-04-11 PROCEDURE — 88175 CYTOPATH C/V AUTO FLUID REDO: CPT

## 2019-04-11 PROCEDURE — 87624 HPV HI-RISK TYP POOLED RSLT: CPT

## 2019-04-11 RX ORDER — OMEGA-3 FATTY ACIDS/FISH OIL 340-1000MG
CAPSULE ORAL
COMMUNITY
Start: 2019-04-09 | End: 2021-02-21

## 2019-04-11 RX ORDER — METFORMIN HYDROCHLORIDE 500 MG/1
TABLET ORAL 2 TIMES DAILY WITH MEALS
COMMUNITY

## 2019-04-11 RX ORDER — MELATONIN
1
COMMUNITY
Start: 2017-01-25

## 2019-04-11 NOTE — PROGRESS NOTES
/76   Pulse 98   Temp 98.5 °F (36.9 °C) (Oral)   Ht 5' 2\" (1.575 m)   Wt 121 lb (54.9 kg)   SpO2 98%   BMI 22.13 kg/m² Patient here for well woman. Patient has no complaints today but states back in Sept 2017 she began bleeding on the 14th and continued througn the 20th.

## 2019-04-11 NOTE — PATIENT INSTRUCTIONS
Well Visit, Women 48 to 72: Care Instructions Your Care Instructions Physical exams can help you stay healthy. Your doctor has checked your overall health and may have suggested ways to take good care of yourself. He or she also may have recommended tests. At home, you can help prevent illness with healthy eating, regular exercise, and other steps. Follow-up care is a key part of your treatment and safety. Be sure to make and go to all appointments, and call your doctor if you are having problems. It's also a good idea to know your test results and keep a list of the medicines you take. How can you care for yourself at home? · Reach and stay at a healthy weight. This will lower your risk for many problems, such as obesity, diabetes, heart disease, and high blood pressure. · Get at least 30 minutes of exercise on most days of the week. Walking is a good choice. You also may want to do other activities, such as running, swimming, cycling, or playing tennis or team sports. · Do not smoke. Smoking can make health problems worse. If you need help quitting, talk to your doctor about stop-smoking programs and medicines. These can increase your chances of quitting for good. · Protect your skin from too much sun. When you're outdoors from 10 a.m. to 4 p.m., stay in the shade or cover up with clothing and a hat with a wide brim. Wear sunglasses that block UV rays. Even when it's cloudy, put broad-spectrum sunscreen (SPF 30 or higher) on any exposed skin. · See a dentist one or two times a year for checkups and to have your teeth cleaned. · Wear a seat belt in the car. · Limit alcohol to 1 drink a day. Too much alcohol can cause health problems. Follow your doctor's advice about when to have certain tests. These tests can spot problems early. · Cholesterol.  Your doctor will tell you how often to have this done based on your age, family history, or other things that can increase your risk for heart attack and stroke. · Blood pressure. Have your blood pressure checked during a routine doctor visit. Your doctor will tell you how often to check your blood pressure based on your age, your blood pressure results, and other factors. · Mammogram. Ask your doctor how often you should have a mammogram, which is an X-ray of your breasts. A mammogram can spot breast cancer before it can be felt and when it is easiest to treat. · Pap test and pelvic exam. Ask your doctor how often you should have a Pap test. You may not need to have a Pap test as often as you used to. · Vision. Have your eyes checked every year or two or as often as your doctor suggests. Some experts recommend that you have yearly exams for glaucoma and other age-related eye problems starting at age 48. · Hearing. Tell your doctor if you notice any change in your hearing. You can have tests to find out how well you hear. · Diabetes. Ask your doctor whether you should have tests for diabetes. · Colon cancer. You should begin tests for colon cancer at age 48. You may have one of several tests. Your doctor will tell you how often to have tests based on your age and risk. Risks include whether you already had a precancerous polyp removed from your colon or whether your parents, sisters and brothers, or children have had colon cancer. · Thyroid disease. Talk to your doctor about whether to have your thyroid checked as part of a regular physical exam. Women have an increased chance of a thyroid problem. · Osteoporosis. You should begin tests for bone density at age 72. If you are younger than 72, ask your doctor whether you have factors that may increase your risk for this disease. You may want to have this test before age 72. · Heart attack and stroke risk. At least every 4 to 6 years, you should have your risk for heart attack and stroke assessed.  Your doctor uses factors such as your age, blood pressure, cholesterol, and whether you smoke or have diabetes to show what your risk for a heart attack or stroke is over the next 10 years. When should you call for help? Watch closely for changes in your health, and be sure to contact your doctor if you have any problems or symptoms that concern you. Where can you learn more? Go to http://bimal-froy.info/. Enter B874 in the search box to learn more about \"Well Visit, Women 50 to 72: Care Instructions. \" Current as of: March 28, 2018 Content Version: 11.9 © 1695-1321 [employer]. Care instructions adapted under license by [employer] (which disclaims liability or warranty for this information). If you have questions about a medical condition or this instruction, always ask your healthcare professional. [address] any warranty or liability for your use of this information. Painful Menstrual Cramps: Care Instructions Your Care Instructions Painful menstrual cramps are very common. Many women go to the doctor because of bad cramps when they get their period. You may have cramps in your back, thighs, and belly. You may also have diarrhea, constipation, or nausea. Some women also get dizzy. Pain medicine and home treatment can help you feel better. Follow-up care is a key part of your treatment and safety. Be sure to make and go to all appointments, and call your doctor if you are having problems. It's also a good idea to know your test results and keep a list of the medicines you take. How can you care for yourself at home? · Take anti-inflammatory medicines for pain. Ibuprofen (Advil, Motrin) and naproxen (Aleve) usually work better than aspirin. ? Be safe with medicines. Talk to your doctor or pharmacist before you take any of these medicines. They may not be safe if you take other medicines or have other health problems.  
? Start taking the recommended dose of pain medicine as soon as you start to feel pain. Or you can start on the day before your period. Keep taking the medicine for as many days as you have cramps. ? If anti-inflammatory medicines don't help, try acetaminophen (Tylenol). ? Do not take two or more pain medicines at the same time unless the doctor told you to. Many pain medicines have acetaminophen, which is Tylenol. Too much acetaminophen (Tylenol) can be harmful. ? Read and follow all instructions on the label. · Put a heating pad set on low or a hot water bottle on your belly. Or take a warm bath. Heat improves blood flow and may help with pain. · Lie down and put a pillow under your knees. Or lie on your side and bring your knees up to your chest. This will help with any back pressure. · Get at least 30 minutes of exercise on most days of the week. This improves blood flow and may decrease pain. Walking is a good choice. You also may want to do other activities, such as running, swimming, cycling, or playing tennis or team sports. When should you call for help? Call your doctor now or seek immediate medical care if: 
  · You have new or worse belly or pelvic pain.  
  · You have severe vaginal bleeding.  
 Watch closely for changes in your health, and be sure to contact your doctor if: 
  · You have unusual vaginal bleeding.  
  · You do not get better as expected. Where can you learn more? Go to http://bimal-froy.info/. Enter [id number] in the search box to learn more about \"Painful Menstrual Cramps: Care Instructions. \" Current as of: May 14, 2018 Content Version: 11.9 © 1754-0166 [employer]. Care instructions adapted under license by [employer] (which disclaims liability or warranty for this information). If you have questions about a medical condition or this instruction, always ask your healthcare professional. [address] any warranty or liability for your use of this information. Vaginal Bleeding After Menopause: Care Instructions Your Care Instructions Vaginal bleeding after menopause can have many causes. Causes may include infection, inflammation, prescription hormones, abnormal growths, and injury. Your doctor may want you to have more tests to find the cause of your vaginal bleeding. Follow-up care is a key part of your treatment and safety. Be sure to make and go to all appointments, and call your doctor if you are having problems. It's also a good idea to know your test results and keep a list of the medicines you take. How can you care for yourself at home? · If your doctor gave you medicine, take it exactly as prescribed. Call your doctor if you think you are having a problem with your medicine. · Do not have sex or put anything inside your vagina until you talk with your doctor. · Do not douche. When should you call for help? Call 911 anytime you think you may need emergency care. For example, call if: 
  · You passed out (lost consciousness).  
 Call your doctor now or seek immediate medical care if: 
  · You have severe vaginal bleeding.  
  · You are dizzy or lightheaded, or you feel like you may faint.  
  · You have new or worse belly or pelvic pain.  
 Watch closely for changes in your health, and be sure to contact your doctor if: 
  · Your bleeding gets worse.  
  · You think you might be pregnant.  
  · You do not get better as expected. Where can you learn more? Go to http://bimal-froy.info/. Enter N304 in the search box to learn more about \"Vaginal Bleeding After Menopause: Care Instructions. \" Current as of: May 14, 2018 Content Version: 11.9 © 3925-9243 [employer]. Care instructions adapted under license by [employer] (which disclaims liability or warranty for this information).  If you have questions about a medical condition or this instruction, always ask your healthcare professional. [employer] disclaims any warranty or liability for your use of this information.
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
## 2019-04-12 LAB
C TRACH RRNA SPEC QL NAA+PROBE: NEGATIVE
N GONORRHOEA RRNA SPEC QL NAA+PROBE: NEGATIVE
SPECIMEN SOURCE: NORMAL
T VAGINALIS RRNA SPEC QL NAA+PROBE: NEGATIVE

## 2019-04-16 NOTE — PROGRESS NOTES
Dear Ms. Mitzi Verdin, Just wanted to let you know that your pap and sexually transmitted infection 
testing was normal. 
 
Have a great day, 
 
Dr. Saray Cuenca

## 2019-05-02 ENCOUNTER — IMPORTED ENCOUNTER (OUTPATIENT)
Dept: URBAN - METROPOLITAN AREA CLINIC 1 | Facility: CLINIC | Age: 59
End: 2019-05-02

## 2019-05-02 PROBLEM — H01.005: Noted: 2019-05-02

## 2019-05-02 PROBLEM — H01.002: Noted: 2019-05-02

## 2019-05-02 PROBLEM — H25.813: Noted: 2019-05-02

## 2019-05-02 PROBLEM — H43.393: Noted: 2019-05-02

## 2019-05-02 PROBLEM — H17.822: Noted: 2019-05-02

## 2019-05-02 PROBLEM — H04.123: Noted: 2019-05-02

## 2019-05-02 PROBLEM — H01.001: Noted: 2019-05-02

## 2019-05-02 PROBLEM — H01.004: Noted: 2019-05-02

## 2019-05-02 PROCEDURE — 92014 COMPRE OPH EXAM EST PT 1/>: CPT

## 2019-05-02 NOTE — PATIENT DISCUSSION
1.  Vitreous Floaters OU -- RD Precautions given. 2.  Dry Eyes OU -- Recommend the frequent use of OTC AT's BID-QID OU Routinely. 3. Anterior Blepharitis OU -- Recommend Hot Moist Compresses and Lid Scrubs / Baby Shampoo QHS OU PRN. 4. Cataracts OU -- Observe for now without intervention. The patient was advised to contact us if any change or worsening of vision. 5. Peripheral Corneal Scar OS -- Observe / Monitor. Letter to Ms. David [place]. Patient defers the refraction at today's visit. Return for an appointment in 1 YR for a 30 OU with Dr. William Garcia.

## 2019-05-21 ENCOUNTER — TELEPHONE (OUTPATIENT)
Dept: OBGYN CLINIC | Age: 59
End: 2019-05-21

## 2020-06-08 ENCOUNTER — IMPORTED ENCOUNTER (OUTPATIENT)
Dept: URBAN - METROPOLITAN AREA CLINIC 1 | Facility: CLINIC | Age: 60
End: 2020-06-08

## 2020-06-08 PROBLEM — H43.813: Noted: 2020-06-08

## 2020-06-08 PROBLEM — H01.001: Noted: 2020-06-08

## 2020-06-08 PROBLEM — H01.004: Noted: 2020-06-08

## 2020-06-08 PROBLEM — H25.813: Noted: 2020-06-08

## 2020-06-08 PROBLEM — H04.123: Noted: 2020-06-08

## 2020-06-08 PROCEDURE — 92015 DETERMINE REFRACTIVE STATE: CPT

## 2020-06-08 PROCEDURE — 92014 COMPRE OPH EXAM EST PT 1/>: CPT

## 2020-06-08 NOTE — PATIENT DISCUSSION
1.  PVD OU - RD precautions. 2.  Dry Eyes OU -The continuation of artificial tears were recommended. 3.  Anterior Blepharitis OU - Daily Hot compresses and lid scrubs were recommended. 4. Cataract OU: Observe for now without intervention. The patient was advised to contact us if any change or worsening of vision5. Return for an appointment for 1 year for a 30 with Dr. Joni Schaumann.

## 2021-02-17 ENCOUNTER — HOSPITAL ENCOUNTER (OUTPATIENT)
Dept: LAB | Age: 61
Discharge: HOME | End: 2021-02-17
Payer: OTHER GOVERNMENT

## 2021-02-17 ENCOUNTER — OFFICE VISIT (OUTPATIENT)
Dept: HEMATOLOGY | Age: 61
End: 2021-02-17
Payer: OTHER GOVERNMENT

## 2021-02-17 VITALS
BODY MASS INDEX: 23.37 KG/M2 | DIASTOLIC BLOOD PRESSURE: 72 MMHG | HEIGHT: 62 IN | TEMPERATURE: 99.3 F | WEIGHT: 127 LBS | OXYGEN SATURATION: 98 % | SYSTOLIC BLOOD PRESSURE: 121 MMHG | HEART RATE: 83 BPM

## 2021-02-17 DIAGNOSIS — R74.8 ELEVATED LIVER ENZYMES: Primary | ICD-10-CM

## 2021-02-17 DIAGNOSIS — R74.8 ELEVATED LIVER ENZYMES: ICD-10-CM

## 2021-02-17 PROBLEM — E78.00 HYPERCHOLESTEREMIA: Status: ACTIVE | Noted: 2021-02-17

## 2021-02-17 PROBLEM — I10 HYPERTENSION: Status: ACTIVE | Noted: 2021-02-17

## 2021-02-17 PROBLEM — E11.9 TYPE II DIABETES MELLITUS (HCC): Status: ACTIVE | Noted: 2021-02-17

## 2021-02-17 LAB
ALBUMIN SERPL-MCNC: 4.4 G/DL (ref 3.4–5)
ALBUMIN/GLOB SERPL: 1.2 {RATIO} (ref 0.8–1.7)
ALP SERPL-CCNC: 67 U/L (ref 45–117)
ALT SERPL-CCNC: 30 U/L (ref 13–56)
ANION GAP SERPL CALC-SCNC: 6 MMOL/L (ref 3–18)
AST SERPL-CCNC: 21 U/L (ref 10–38)
BASOPHILS # BLD: 0 K/UL (ref 0–0.1)
BASOPHILS NFR BLD: 0 % (ref 0–2)
BILIRUB DIRECT SERPL-MCNC: 0.1 MG/DL (ref 0–0.2)
BILIRUB SERPL-MCNC: 0.3 MG/DL (ref 0.2–1)
BUN SERPL-MCNC: 11 MG/DL (ref 7–18)
BUN/CREAT SERPL: 17 (ref 12–20)
CALCIUM SERPL-MCNC: 9.4 MG/DL (ref 8.5–10.1)
CHLORIDE SERPL-SCNC: 106 MMOL/L (ref 100–111)
CO2 SERPL-SCNC: 29 MMOL/L (ref 21–32)
CREAT SERPL-MCNC: 0.65 MG/DL (ref 0.6–1.3)
DIFFERENTIAL METHOD BLD: ABNORMAL
EOSINOPHIL # BLD: 0.1 K/UL (ref 0–0.4)
EOSINOPHIL NFR BLD: 2 % (ref 0–5)
ERYTHROCYTE [DISTWIDTH] IN BLOOD BY AUTOMATED COUNT: 12.3 % (ref 11.6–14.5)
FERRITIN SERPL-MCNC: 130 NG/ML (ref 8–388)
GLOBULIN SER CALC-MCNC: 3.6 G/DL (ref 2–4)
GLUCOSE SERPL-MCNC: 90 MG/DL (ref 74–99)
HCT VFR BLD AUTO: 38 % (ref 35–45)
HGB BLD-MCNC: 12.5 G/DL (ref 12–16)
INR PPP: 1 (ref 0.8–1.2)
IRON SATN MFR SERPL: 28 % (ref 20–50)
IRON SERPL-MCNC: 99 UG/DL (ref 50–175)
LYMPHOCYTES # BLD: 1.9 K/UL (ref 0.9–3.6)
LYMPHOCYTES NFR BLD: 31 % (ref 21–52)
MCH RBC QN AUTO: 30.9 PG (ref 24–34)
MCHC RBC AUTO-ENTMCNC: 32.9 G/DL (ref 31–37)
MCV RBC AUTO: 94.1 FL (ref 74–97)
MONOCYTES # BLD: 0.3 K/UL (ref 0.05–1.2)
MONOCYTES NFR BLD: 5 % (ref 3–10)
NEUTS SEG # BLD: 3.8 K/UL (ref 1.8–8)
NEUTS SEG NFR BLD: 62 % (ref 40–73)
PLATELET # BLD AUTO: 319 K/UL (ref 135–420)
PMV BLD AUTO: 10.2 FL (ref 9.2–11.8)
POTASSIUM SERPL-SCNC: 4 MMOL/L (ref 3.5–5.5)
PROT SERPL-MCNC: 8 G/DL (ref 6.4–8.2)
PROTHROMBIN TIME: 12.5 SEC (ref 11.5–15.2)
RBC # BLD AUTO: 4.04 M/UL (ref 4.2–5.3)
SODIUM SERPL-SCNC: 141 MMOL/L (ref 136–145)
TIBC SERPL-MCNC: 359 UG/DL (ref 250–450)
WBC # BLD AUTO: 6.1 K/UL (ref 4.6–13.2)

## 2021-02-17 PROCEDURE — 36415 COLL VENOUS BLD VENIPUNCTURE: CPT

## 2021-02-17 PROCEDURE — 82103 ALPHA-1-ANTITRYPSIN TOTAL: CPT

## 2021-02-17 PROCEDURE — 82390 ASSAY OF CERULOPLASMIN: CPT

## 2021-02-17 PROCEDURE — 86708 HEPATITIS A ANTIBODY: CPT

## 2021-02-17 PROCEDURE — 80076 HEPATIC FUNCTION PANEL: CPT

## 2021-02-17 PROCEDURE — 85025 COMPLETE CBC W/AUTO DIFF WBC: CPT

## 2021-02-17 PROCEDURE — 80048 BASIC METABOLIC PNL TOTAL CA: CPT

## 2021-02-17 PROCEDURE — 99203 OFFICE O/P NEW LOW 30 MIN: CPT | Performed by: INTERNAL MEDICINE

## 2021-02-17 PROCEDURE — 82728 ASSAY OF FERRITIN: CPT

## 2021-02-17 PROCEDURE — 86038 ANTINUCLEAR ANTIBODIES: CPT

## 2021-02-17 PROCEDURE — 85610 PROTHROMBIN TIME: CPT

## 2021-02-17 PROCEDURE — 83540 ASSAY OF IRON: CPT

## 2021-02-17 PROCEDURE — 83516 IMMUNOASSAY NONANTIBODY: CPT

## 2021-02-17 NOTE — PROGRESS NOTES
[facility] [address]      Eduardo Mondragon MD, Kimmy Brandt, Cici Jaramillo MD, MPH      Zachary Aldana, PA-JARET Parsons, [facility]     Virginia Choudhury, [facility]   Peña Vargas FNP-C    Kaerem Thrasher, [facility]       Yaz Garber [address]    at [address], [facility]  22.    [phone]    FAX: [address]    [facility]    [address], [address]    [place], [address]    [phone]    FAX: [phone]       Patient Care Team:  Jacque Saavedra MD as PCP - General (Family Medicine)  Lalita Landry MD (Internal Medicine)  Danielle Cover  PCP: Misbah Lew NP      Problem List  Date Reviewed: 2/21/2021          Codes Class Noted    Hypertension ICD-10-CM: I10  ICD-9-CM: 401.9  2/17/2021        Elevated liver enzymes ICD-10-CM: R74.8  ICD-9-CM: 790.5  2/17/2021        Hypercholesteremia ICD-10-CM: E78.00  ICD-9-CM: 272.0  2/17/2021        Type II diabetes mellitus [facility]) ICD-10-CM: E11.9  ICD-9-CM: 250.00  2/17/2021              The clinicians listed above have asked me to see Singh Engel in consultation regarding elevated liver enzymes and its management. All medical records sent by the referring physicians were reviewed including imaging studies     The patient is a 62 y.o.  female from [place] [address] who was found to have elevated liver transaminases in 12/2019. Serologic evaluation for markers of chronic liver disease was negative for HCV, HBV,     Ultrasound of the liver was performed in 1/2020. The results of the imaging demonstrated a normal appearing liver. An assessment of liver fibrosis with biopsy or elastography has not been performed. The patient has no symptoms which can be attributed to the liver disorder.     The patient is not currently experiencing the following symptoms of liver disease:  fatigue, pain in the right side over the liver,     The patient completes all daily activities without any functional limitations. ASSESSMENT AND PLAN:  Elevated liver enzymes  Intermittent elevation in liver transaminases which are now normal.    Have performed laboratory testing to monitor liver function and degree of liver injury. This included BMP, hepatic panel, CBC with platelet count, INR. Liver transaminases are now normal.  ALP is normal.  Liver function is normal.  The platelet count is normal.      Based upon laboratory studies and imaging the patient does not appear to have significant liver injury. Serologic testing for causes of chronic liver disease was ordered. Result was positive for ASMA    The need to perform an assessment of liver fibrosis was discussed with the patient. The Fibroscan can assess liver fibrosis and determine if a patient has advanced fibrosis or cirrhosis without the need for liver biopsy. This will be performed at the next office visit. If the Fibroscan suggests advanced fibrosis then a liver biopsy should be considered. The Fibroscan can be repeated annually or as often as clinically indicated to assess for fibrosis progression and/or regression. Screening for Hepatocellular Carcinoma  HCC screening is not necessary if the patient has no evidence of cirrhosis. Treatment of other medical problems in patients with chronic liver disease  There are no contraindications for the patient to take most medications that are necessary for treatment of other medical issues. Counseling for alcohol in patients with chronic liver disease  The patient was counseled regarding alcohol consumption and the effect of alcohol on chronic liver disease. The patient does not consume any significant amount of alcohol. Vaccinations   Vaccination for viral hepatitis A and B is not needed.   The patient has serologic evidence of prior exposure or vaccination with immunity. Routine vaccinations against other bacterial and viral agents can be performed as indicated. Annual flu vaccination should be administered if indicated. ALLERGIES  Allergies   Allergen Reactions    Pcn [Penicillins] Rash       MEDICATIONS  Current Outpatient Medications   Medication Sig    metFORMIN (GLUCOPHAGE) 500 mg tablet Take  by mouth two (2) times daily (with meals).  cholecalciferol (VITAMIN D3) 1,000 unit tablet Take 1 Tab by mouth.  amLODIPine (NORVASC) 2.5 mg tablet Take 2.5 mg by mouth daily.  rosuvastatin (CRESTOR) 5 mg tablet Take 5 mg by mouth daily.  raloxifene (EVISTA) 60 mg tablet Take  by mouth daily.  PSYLLIUM HUSK/CALCIUM CARB (METAMUCIL PLUS CALCIUM PO) Take  by mouth.  ascorbic acid (VITAMIN C) 500 mg tablet Take  by mouth.  FISH -1,000 mg capsule     PV W-O AJEL/FERROUS FUMARATE/FA (M-VIT PO) Take  by mouth.  Omega-3-DHA-EPA-Fish Oil 1,000 mg (120 mg-180 mg) cap Take  by mouth. No current facility-administered medications for this visit. SYSTEM REVIEW NOT RELATED TO LIVER DISEASE OR REVIEWED ABOVE:  Constitution systems: Negative for fever, chills, weight gain, weight loss. Eyes: Negative for visual changes. ENT: Negative for sore throat, painful swallowing. Respiratory: Negative for cough, hemoptysis, SOB. Cardiology: Negative for chest pain, palpitations. GI:  Negative for constipation or diarrhea. : Negative for urinary frequency, dysuria, hematuria, nocturia. Skin: Negative for rash. Hematology: Negative for easy bruising, blood clots. Musculo-skelatal: Negative for back pain, muscle pain, weakness. Neurologic: Negative for headaches, dizziness, vertigo, memory problems not related to HE. Psychology: Negative for anxiety, depression. FAMILY HISTORY:  The father  of lung cancer. The mother  of CVA.     There is no family history of liver disease. SOCIAL HISTORY:  The patient is . The patient has 1 child. The patient stopped using tobacco products in 1988. The patient consumes alcohol on social occasions never in excess. The patient has been abstinent from alcohol since 2015. The patient used to work as . The patient has not worked since 2015. PHYSICAL EXAMINATION:  Visit Vitals  /72   Pulse 83   Temp 99.3 °F (37.4 °C) (Tympanic)   Ht 5' 2\" (1.575 m)   Wt 127 lb (57.6 kg)   SpO2 98%   BMI 23.23 kg/m²     General: No acute distress. Eyes: Sclera anicteric. ENT: No oral lesions. Thyroid normal.  Nodes: No adenopathy. Skin: No spider angiomata. No jaundice. No palmar erythema. Respiratory: Lungs clear to auscultation. Cardiovascular: Regular heart rate. No murmurs. No JVD. Abdomen: Soft non-tender. Liver size normal to percussion/palpation. Spleen not palpable. No obvious ascites. Extremities: No edema. No muscle wasting. No gross arthritic changes. Neurologic: Alert and oriented. Cranial nerves grossly intact. No asterixis. LABORATORY STUDIES:  Liver [place] of [address] [place] [address] 2/17/2021   WBC 4.6 - 13.2 K/uL 6.1   ANC 1.8 - 8.0 K/UL 3.8   HGB 12.0 - 16.0 g/dL 12.5    - 420 K/uL 319   INR 0.8 - 1.2   1.0   AST 10 - 38 U/L 21   ALT 13 - 56 U/L 30   Alk Phos 45 - 117 U/L 67   Bili, Total 0.2 - 1.0 MG/DL 0.3   Bili, Direct 0.0 - 0.2 MG/DL 0.1   Albumin 3.4 - 5.0 g/dL 4.4   BUN 7.0 - 18 MG/DL 11   Creat 0.6 - 1.3 MG/DL 0.65   Na 136 - 145 mmol/L 141   K 3.5 - 5.5 mmol/L 4.0   Cl 100 - 111 mmol/L 106   CO2 21 - 32 mmol/L 29   Glucose 74 - 99 mg/dL 90     SEROLOGIES:  9/2019.   HBsurface antigen negative, anti-HBcore negative, anti-HBsurface positive    Serologies Latest Ref Rng & Units 2/17/2021 1/12/2016   Hep A Ab, Total Negative   Positive (A)    Hep B Surface Ag <1.00 Index  <0.10   Hep B Surface Ag Interp NEG    NEGATIVE Hep C Ab 0.0 - 0.9 s/co ratio  <0.1   Ferritin 8 - 388 NG/    Iron % Saturation 20 - 50 % 28    OTILIA, IFA  Negative    ASMCA 0 - 19 Units 22 (H)    Ceruloplasmin 19.0 - 39.0 mg/dL 31.2    Alpha-1 antitrypsin level 101 - 187 mg/dL 121      LIVER HISTOLOGY:  Not available or performed    ENDOSCOPIC PROCEDURES:  Not available or performed    RADIOLOGY:  1/2021. Ultrasound of liver. Normal appearing liver. No liver mass lesions. OTHER TESTING:  Not available or performed    FOLLOW-UP:  All of the issues listed above in the Assessment and Plan were discussed with the patient. All questions were answered. The patient expressed a clear understanding of the above. [address] [facility] 87 in 4 weeks for Fibroscan to review all data and determine the treatment plan.       Meg Castorena MD  [address]  [address] [place] [address], [address]  [address] [place] [address]

## 2021-02-18 LAB
A1AT SERPL-MCNC: 121 MG/DL (ref 101–187)
CERULOPLASMIN SERPL-MCNC: 31.2 MG/DL (ref 19–39)
HAV AB SER QL IA: POSITIVE

## 2021-02-19 LAB
ACTIN IGG SERPL-ACNC: 22 UNITS (ref 0–19)
ANA TITR SER IF: NEGATIVE {TITER}

## 2021-04-01 ENCOUNTER — OFFICE VISIT (OUTPATIENT)
Dept: HEMATOLOGY | Age: 61
End: 2021-04-01
Payer: OTHER GOVERNMENT

## 2021-04-01 VITALS
HEART RATE: 78 BPM | TEMPERATURE: 96.5 F | OXYGEN SATURATION: 98 % | WEIGHT: 126 LBS | HEIGHT: 62 IN | DIASTOLIC BLOOD PRESSURE: 67 MMHG | RESPIRATION RATE: 16 BRPM | BODY MASS INDEX: 23.19 KG/M2 | SYSTOLIC BLOOD PRESSURE: 112 MMHG

## 2021-04-01 DIAGNOSIS — R74.8 ELEVATED LIVER ENZYMES: Primary | ICD-10-CM

## 2021-04-01 PROCEDURE — 91200 LIVER ELASTOGRAPHY: CPT | Performed by: NURSE PRACTITIONER

## 2021-04-01 PROCEDURE — 99214 OFFICE O/P EST MOD 30 MIN: CPT | Performed by: NURSE PRACTITIONER

## 2021-04-01 NOTE — PROGRESS NOTES
Anjelica Dixon MD, [address], [place] Leanna Trevino, Parth Duong MD, MPH      Jeni Dixon, PA-JARET Sepulveda, [facility]     Virginia Choudhury, [facility]   Ezequiel Lv, P-C    Benedicto Carpenter, [facility]       Yazdilshad Cheathamuta Raffaele De Fry 136    at [address], [address] [place] [address]    [address] [facility] 22.    [phone]    FAX: [address] [place] [address]    at [address], [address]    [phone]    FAX: [phone]       Patient Care Team:  Camden Albert MD as PCP - General (Family Medicine)  Radha Lake MD (Internal Medicine)  Jose Allen  PCP: Micaela Kumari NP      Problem List  Date Reviewed: 2/21/2021          Codes Class Noted    Hypertension ICD-10-CM: I10  ICD-9-CM: 401.9  2/17/2021        Elevated liver enzymes ICD-10-CM: R74.8  ICD-9-CM: 790.5  2/17/2021        Hypercholesteremia ICD-10-CM: E78.00  ICD-9-CM: 272.0  2/17/2021        Type II diabetes mellitus ([facility] ICD-10-CM: E11.9  ICD-9-CM: 250.00  2/17/2021              Tobias Guzman is being seen at [facility] for management of elevated liver enzymes. The active problem list, all pertinent past medical history, medications, liver histology, radiologic findings, and laboratory findings related to the liver disorder were reviewed and discussed with the patient. The patient is a 62 y.o.  female from [facility] who was found to have elevated liver transaminases in 12/2019. Serologic evaluation for markers of chronic liver disease was negative positive for ASMA. Ultrasound of the liver was performed in 1/2020. The results of the imaging demonstrated a normal appearing liver. Assessment of liver fibrosis with Fibroscan was performed in the office today.  The result was 3.5 kPa which correlates with no fibrosis. The CAP score of 259 suggests mild hepatic steatosis. The patient has no symptoms which can be attributed to the liver disorder. The patient is not currently experiencing the following symptoms of liver disease:  fatigue, pain in the right side over the liver,     The patient completes all daily activities without any functional limitations. ASSESSMENT AND PLAN:  Elevated liver enzymes  Intermittent elevation in liver transaminases which are now normal.    Have performed laboratory testing to monitor liver function and degree of liver injury. This included BMP, hepatic panel, CBC with platelet count, INR. Liver transaminases are now normal.  ALP is normal.  Liver function is normal.  The platelet count is normal.      Based upon laboratory studies and imaging the patient does not appear to have significant liver injury. Serologic testing for causes of chronic liver disease was ordered. Result was positive for ASMA    The need to perform an assessment of liver fibrosis was discussed with the patient. The Fibroscan can assess liver fibrosis and determine if a patient has advanced fibrosis or cirrhosis without the need for liver biopsy. The Fibroscan can be repeated annually or as often as clinically indicated to assess for fibrosis progression and/or regression. Positive serology for autoimmune liver disease  The positive ASMA suggests that there the may be an underlying autoimmune liver disease. Given that the liver enzymes have returned to normal it is unlikely there is an autoimmune liver disorder. Will continue to monitor liver enzymes. If liver enzymes remain elevated despite weight loss a liver biopsy will eventually need to be performed to determine if this is autoimmune liver disease or fatty liver. The patient does not appear to have an autoimmune liver disease.   The positive autoimmune marker has no clinical significance at this time. Screening for Hepatocellular Carcinoma  HCC screening is not necessary if the patient has no evidence of cirrhosis. Treatment of other medical problems in patients with chronic liver disease  There are no contraindications for the patient to take most medications that are necessary for treatment of other medical issues. Counseling for alcohol in patients with chronic liver disease  The patient was counseled regarding alcohol consumption and the effect of alcohol on chronic liver disease. The patient does not consume any significant amount of alcohol. Vaccinations   Vaccination for viral hepatitis A and B is not needed. The patient has serologic evidence of prior exposure or vaccination with immunity. Routine vaccinations against other bacterial and viral agents can be performed as indicated. Annual flu vaccination should be administered if indicated. ALLERGIES  Allergies   Allergen Reactions    Pcn [Penicillins] Rash       MEDICATIONS  Current Outpatient Medications   Medication Sig    metFORMIN (GLUCOPHAGE) 500 mg tablet Take  by mouth two (2) times daily (with meals).  cholecalciferol (VITAMIN D3) 1,000 unit tablet Take 1 Tab by mouth.  amLODIPine (NORVASC) 2.5 mg tablet Take 2.5 mg by mouth daily.  rosuvastatin (CRESTOR) 5 mg tablet Take 5 mg by mouth daily.  raloxifene (EVISTA) 60 mg tablet Take  by mouth daily.  PSYLLIUM HUSK/CALCIUM CARB (METAMUCIL PLUS CALCIUM PO) Take  by mouth.  ascorbic acid (VITAMIN C) 500 mg tablet Take  by mouth. No current facility-administered medications for this visit. SYSTEM REVIEW NOT RELATED TO LIVER DISEASE OR REVIEWED ABOVE:  Constitution systems: Negative for fever, chills, weight gain, weight loss. Eyes: Negative for visual changes. ENT: Negative for sore throat, painful swallowing. Respiratory: Negative for cough, hemoptysis, SOB. Cardiology: Negative for chest pain, palpitations.   GI: Negative for constipation or diarrhea. : Negative for urinary frequency, dysuria, hematuria, nocturia. Skin: Negative for rash. Hematology: Negative for easy bruising, blood clots. Musculo-skelatal: Negative for back pain, muscle pain, weakness. Neurologic: Negative for headaches, dizziness, vertigo, memory problems not related to HE. Psychology: Negative for anxiety, depression. FAMILY HISTORY:  The father  of lung cancer. The mother  of CVA. There is no family history of liver disease. SOCIAL HISTORY:  The patient is . The patient has 1 child. The patient stopped using tobacco products in . The patient consumes alcohol on social occasions never in excess. The patient has been abstinent from alcohol since . The patient used to work as . The patient has not worked since . PHYSICAL EXAMINATION:  Visit Vitals  /67 (BP 1 Location: Left arm, BP Patient Position: Sitting)   Pulse 78   Temp (!) 96.5 °F (35.8 °C) (Temporal)   Resp 16   Ht 5' 2\" (1.575 m)   Wt 126 lb (57.2 kg)   SpO2 98%   BMI 23.05 kg/m²     General: No acute distress. Eyes: Sclera anicteric. ENT: No oral lesions. Thyroid normal.  Nodes: No adenopathy. Skin: No spider angiomata. No jaundice. No palmar erythema. Respiratory: Lungs clear to auscultation. Cardiovascular: Regular heart rate. No murmurs. No JVD. Abdomen: Soft non-tender. Liver size normal to percussion/palpation. Spleen not palpable. No obvious ascites. Extremities: No edema. No muscle wasting. No gross arthritic changes. Neurologic: Alert and oriented. Cranial nerves grossly intact. No asterixis.     LABORATORY STUDIES:  Liver [place] of [address] Units 2021   WBC 4.6 - 13.2 K/uL 6.1   ANC 1.8 - 8.0 K/UL 3.8   HGB 12.0 - 16.0 g/dL 12.5    - 420 K/uL 319   INR 0.8 - 1.2   1.0   AST 10 - 38 U/L 21   ALT 13 - 56 U/L 30   Alk Phos 45 - 117 U/L 67   Bili, Total 0.2 - 1.0 MG/DL 0.3   Bili, Direct 0.0 - 0.2 MG/DL 0.1   Albumin 3.4 - 5.0 g/dL 4.4   BUN 7.0 - 18 MG/DL 11   Creat 0.6 - 1.3 MG/DL 0.65   Na 136 - 145 mmol/L 141   K 3.5 - 5.5 mmol/L 4.0   Cl 100 - 111 mmol/L 106   CO2 21 - 32 mmol/L 29   Glucose 74 - 99 mg/dL 90     SEROLOGIES:  9/2019. HBsurface antigen negative, anti-HBcore negative, anti-HBsurface positive    Serologies Latest Ref Rng & Units 2/17/2021 1/12/2016   Hep A Ab, Total Negative   Positive (A)    Hep B Surface Ag <1.00 Index  <0.10   Hep B Surface Ag Interp NEG    NEGATIVE   Hep C Ab 0.0 - 0.9 s/co ratio  <0.1   Ferritin 8 - 388 NG/    Iron % Saturation 20 - 50 % 28    OTILIA, IFA  Negative    ASMCA 0 - 19 Units 22 (H)    Ceruloplasmin 19.0 - 39.0 mg/dL 31.2    Alpha-1 antitrypsin level 101 - 187 mg/dL 121      LIVER HISTOLOGY:  Assessment of liver fibrosis with Fibroscan was performed in the office today. The result was 3.5 kPa which correlates with no fibrosis. The CAP score of 259 suggests mild hepatic steatosis. ENDOSCOPIC PROCEDURES:  Not available or performed    RADIOLOGY:  1/2021. Ultrasound of liver. Normal appearing liver. No liver mass lesions. OTHER TESTING:  Not available or performed    FOLLOW-UP:  All of the issues listed above in the Assessment and Plan were discussed with the patient. All questions were answered. The patient expressed a clear understanding of the above. [facility] 87 in 6 months for routine monitoring.        [facility], AGGHAZALNP-BC  [address] Liver [place] of [facility]  [address], [address]  98 Lois Fuentes, [address]  [phone]

## 2021-07-08 ENCOUNTER — IMPORTED ENCOUNTER (OUTPATIENT)
Dept: URBAN - METROPOLITAN AREA CLINIC 1 | Facility: CLINIC | Age: 61
End: 2021-07-08

## 2021-07-08 PROBLEM — H01.004: Noted: 2021-07-08

## 2021-07-08 PROBLEM — H43.393: Noted: 2021-07-08

## 2021-07-08 PROBLEM — H25.813: Noted: 2021-07-08

## 2021-07-08 PROBLEM — H04.123: Noted: 2021-07-08

## 2021-07-08 PROBLEM — H01.001: Noted: 2021-07-08

## 2021-07-08 PROBLEM — Z79.84: Noted: 2021-07-08

## 2021-07-08 PROBLEM — H43.813: Noted: 2021-07-08

## 2021-07-08 PROBLEM — H17.822: Noted: 2021-07-08

## 2021-07-08 PROBLEM — E11.9: Noted: 2021-07-08

## 2021-07-08 PROCEDURE — 92014 COMPRE OPH EXAM EST PT 1/>: CPT

## 2021-07-08 PROCEDURE — 92015 DETERMINE REFRACTIVE STATE: CPT

## 2021-07-08 NOTE — PATIENT DISCUSSION
Anterior Blepharitis OU -- Cont daily hot compresses lid scrubs and washing lids with baby shampoo QHS.

## 2021-07-08 NOTE — PATIENT DISCUSSION
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 The patient stated a full understanding and a desire to proceed with the procedure. The patient will need to return for preop appointment with cataract measurements and to have any additional questions answered and start pre-operative eye drops as directed.  **PMG did not see today**Phaco PCLOtherwise follow-up in 6 months 10/DFE/Maureen

## 2021-07-08 NOTE — PATIENT DISCUSSION
1.  DM Type II (Oral Meds) -- without sign of diabetic retinopathy and no blot heme on dilated retinal examination today OU No Macular Edema. Discussed the pathophysiology of diabetes and its effect on the eye and risk of blindness. Stressed the importance of strong glucose control. Advised of importance of at least yearly dilated examinations but to contact us immediately for any problems or concerns. 2. Cataract OU -- Visually Significant secondary to glare however patient wishes to hold off on Phaco. Will re-evaluate in 1 year. 3.  Dry Eyes OU -- Cont ATs TID OU routinely. 4.  Anterior Blepharitis OU -- Cont daily hot compresses lid scrubs and washing lids with baby shampoo QHS. 5.  Peripheral Corneal Scar OS -- Stable. Observe. 6.  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 Stable. RD Precautions. MRx for glasses given to patient. Letter to PCP. Return for an appointment in 1 year 30/Glare with Dr. Dixie Cheung.

## 2021-10-05 ENCOUNTER — OFFICE VISIT (OUTPATIENT)
Dept: HEMATOLOGY | Age: 61
End: 2021-10-05
Payer: OTHER GOVERNMENT

## 2021-10-05 ENCOUNTER — HOSPITAL ENCOUNTER (OUTPATIENT)
Dept: LAB | Age: 61
Discharge: HOME | End: 2021-10-05
Payer: OTHER GOVERNMENT

## 2021-10-05 VITALS
DIASTOLIC BLOOD PRESSURE: 78 MMHG | TEMPERATURE: 97.3 F | BODY MASS INDEX: 23 KG/M2 | HEART RATE: 93 BPM | OXYGEN SATURATION: 98 % | WEIGHT: 125 LBS | RESPIRATION RATE: 23 BRPM | HEIGHT: 62 IN | SYSTOLIC BLOOD PRESSURE: 128 MMHG

## 2021-10-05 DIAGNOSIS — R74.8 ELEVATED LIVER ENZYMES: ICD-10-CM

## 2021-10-05 DIAGNOSIS — R74.8 ELEVATED LIVER ENZYMES: Primary | ICD-10-CM

## 2021-10-05 LAB
ALBUMIN SERPL-MCNC: 3.9 G/DL (ref 3.4–5)
ALBUMIN/GLOB SERPL: 1.1 {RATIO} (ref 0.8–1.7)
ALP SERPL-CCNC: 76 U/L (ref 45–117)
ALT SERPL-CCNC: 26 U/L (ref 13–56)
ANION GAP SERPL CALC-SCNC: 5 MMOL/L (ref 3–18)
AST SERPL-CCNC: 22 U/L (ref 10–38)
BASOPHILS # BLD: 0 K/UL (ref 0–0.1)
BASOPHILS NFR BLD: 1 % (ref 0–2)
BILIRUB DIRECT SERPL-MCNC: 0.1 MG/DL (ref 0–0.2)
BILIRUB SERPL-MCNC: 0.3 MG/DL (ref 0.2–1)
BUN SERPL-MCNC: 9 MG/DL (ref 7–18)
BUN/CREAT SERPL: 17 (ref 12–20)
CALCIUM SERPL-MCNC: 9.1 MG/DL (ref 8.5–10.1)
CHLORIDE SERPL-SCNC: 108 MMOL/L (ref 100–111)
CO2 SERPL-SCNC: 29 MMOL/L (ref 21–32)
CREAT SERPL-MCNC: 0.53 MG/DL (ref 0.6–1.3)
DIFFERENTIAL METHOD BLD: ABNORMAL
EOSINOPHIL # BLD: 0.1 K/UL (ref 0–0.4)
EOSINOPHIL NFR BLD: 2 % (ref 0–5)
ERYTHROCYTE [DISTWIDTH] IN BLOOD BY AUTOMATED COUNT: 12.1 % (ref 11.6–14.5)
GLOBULIN SER CALC-MCNC: 3.6 G/DL (ref 2–4)
GLUCOSE SERPL-MCNC: 111 MG/DL (ref 74–99)
HCT VFR BLD AUTO: 37.4 % (ref 35–45)
HGB BLD-MCNC: 12 G/DL (ref 12–16)
LYMPHOCYTES # BLD: 1.4 K/UL (ref 0.9–3.6)
LYMPHOCYTES NFR BLD: 23 % (ref 21–52)
MCH RBC QN AUTO: 30.5 PG (ref 24–34)
MCHC RBC AUTO-ENTMCNC: 32.1 G/DL (ref 31–37)
MCV RBC AUTO: 94.9 FL (ref 78–100)
MONOCYTES # BLD: 0.4 K/UL (ref 0.05–1.2)
MONOCYTES NFR BLD: 7 % (ref 3–10)
NEUTS SEG # BLD: 4.1 K/UL (ref 1.8–8)
NEUTS SEG NFR BLD: 67 % (ref 40–73)
PLATELET # BLD AUTO: 273 K/UL (ref 135–420)
PMV BLD AUTO: 10.3 FL (ref 9.2–11.8)
POTASSIUM SERPL-SCNC: 3.8 MMOL/L (ref 3.5–5.5)
PROT SERPL-MCNC: 7.5 G/DL (ref 6.4–8.2)
RBC # BLD AUTO: 3.94 M/UL (ref 4.2–5.3)
SODIUM SERPL-SCNC: 142 MMOL/L (ref 136–145)
WBC # BLD AUTO: 6.1 K/UL (ref 4.6–13.2)

## 2021-10-05 PROCEDURE — 36415 COLL VENOUS BLD VENIPUNCTURE: CPT

## 2021-10-05 PROCEDURE — 80048 BASIC METABOLIC PNL TOTAL CA: CPT

## 2021-10-05 PROCEDURE — 80076 HEPATIC FUNCTION PANEL: CPT

## 2021-10-05 PROCEDURE — 85025 COMPLETE CBC W/AUTO DIFF WBC: CPT

## 2021-10-05 PROCEDURE — 99213 OFFICE O/P EST LOW 20 MIN: CPT | Performed by: NURSE PRACTITIONER

## 2021-10-05 RX ORDER — OMEGA-3-ACID ETHYL ESTERS 1 G/1
2 CAPSULE, LIQUID FILLED ORAL 2 TIMES DAILY
COMMUNITY

## 2021-10-05 RX ORDER — BISMUTH SUBSALICYLATE 262 MG
1 TABLET,CHEWABLE ORAL DAILY
COMMUNITY

## 2021-10-05 NOTE — PROGRESS NOTES
[facility], MD, [address], [place] Leanna Trevino, Jason Bass MD, MPH      Pedro Luis Rocha, PA-JARET Wetzel, ACN-BC     April S Macey, [facility]   Alexander Wrightley, FNP-C    Ramses Bernard, [facility]       Yaz Cheathamuta Raffaele De Fry 136    at [address], [address] William Thompson  22.    [phone]    FAX: [address] [place] [address]    at [address], [address]    [phone]    FAX: [phone]       Patient Care Team:  Sam Rojas NP as PCP - General (Nurse Practitioner)  Malathi Torres MD (Internal Medicine)      Problem List  Date Reviewed: 10/5/2021        Codes Class Noted    Hypertension ICD-10-CM: I10  ICD-9-CM: 401.9  2/17/2021        Elevated liver enzymes ICD-10-CM: R74.8  ICD-9-CM: 790.5  2/17/2021        Hypercholesteremia ICD-10-CM: E78.00  ICD-9-CM: 272.0  2/17/2021        Type II diabetes mellitus ([facility] ICD-10-CM: E11.9  ICD-9-CM: 250.00  2/17/2021              Katt Eller is being seen at [address] for management of elevated liver enzymes. The active problem list, all pertinent past medical history, medications, liver histology, radiologic findings, and laboratory findings related to the liver disorder were reviewed and discussed with the patient. The patient is a 62 y.o. female who was found to have elevated liver transaminases in 12/2019. Serologic evaluation for markers of chronic liver disease was positive for ASMA. Ultrasound of the liver was performed in 1/2020. The results of the imaging demonstrated a normal appearing liver. Assessment of liver fibrosis with Fibroscan was performed in 4/2021. The result was 3.5 kPa which correlates with no fibrosis.  The CAP score of 259 suggests mild hepatic steatosis. The patient has no symptoms which can be attributed to the liver disorder. The patient is not currently experiencing the following symptoms of liver disease:  fatigue, pain in the right side over the liver    The patient completes all daily activities without any functional limitations. ASSESSMENT AND PLAN:  Elevated liver enzymes  Intermittent elevation in liver transaminases which are now normal.    Have performed laboratory testing to monitor liver function and degree of liver injury. This included BMP, hepatic panel, CBC with platelet count, INR. Liver transaminases are now normal. ALP is normal. Liver function is normal. The platelet count is normal.    Based upon laboratory studies and imaging the patient does not appear to have significant liver injury. Serologic testing for causes of chronic liver disease was ordered. Result was positive for ASMA    The need to perform an assessment of liver fibrosis was discussed with the patient. The Fibroscan can assess liver fibrosis and determine if a patient has advanced fibrosis or cirrhosis without the need for liver biopsy. The Fibroscan can be repeated annually or as often as clinically indicated to assess for fibrosis progression and/or regression. Positive serology for autoimmune liver disease  The positive ASMA suggests that there the may be an underlying autoimmune liver disease. Given that the liver enzymes have returned to normal it is unlikely there is an autoimmune liver disorder. The patient does not appear to have an autoimmune liver disease. The positive autoimmune marker has no clinical significance at this time. Screening for Hepatocellular Carcinoma  HCC screening is not necessary if the patient has no evidence of cirrhosis.     Treatment of other medical problems in patients with chronic liver disease  There are no contraindications for the patient to take most medications that are necessary for treatment of other medical issues. Counseling for alcohol in patients with chronic liver disease  The patient was counseled regarding alcohol consumption and the effect of alcohol on chronic liver disease. The patient does not consume any significant amount of alcohol. Vaccinations   Vaccination for viral hepatitis A and B is not needed. The patient has serologic evidence of prior exposure or vaccination with immunity. Routine vaccinations against other bacterial and viral agents can be performed as indicated. Annual flu vaccination should be administered if indicated. ALLERGIES  Allergies   Allergen Reactions    Pcn [Penicillins] Rash       MEDICATIONS  Current Outpatient Medications   Medication Sig    omega-3 acid ethyl esters (LOVAZA) 1 gram capsule Take 2 g by mouth two (2) times a day.  multivitamin (ONE A DAY) tablet Take 1 Tablet by mouth daily.  metFORMIN (GLUCOPHAGE) 500 mg tablet Take  by mouth two (2) times daily (with meals).  cholecalciferol (VITAMIN D3) 1,000 unit tablet Take 1 Tab by mouth.  amLODIPine (NORVASC) 2.5 mg tablet Take 2.5 mg by mouth daily.  rosuvastatin (CRESTOR) 5 mg tablet Take 5 mg by mouth daily.  raloxifene (EVISTA) 60 mg tablet Take  by mouth daily.  PSYLLIUM HUSK/CALCIUM CARB (METAMUCIL PLUS CALCIUM PO) Take  by mouth.  ascorbic acid (VITAMIN C) 500 mg tablet Take  by mouth. No current facility-administered medications for this visit. SYSTEM REVIEW NOT RELATED TO LIVER DISEASE OR REVIEWED ABOVE:  Constitution systems: Negative for fever, chills, weight gain, weight loss. Eyes: Negative for visual changes. ENT: Negative for sore throat, painful swallowing. Respiratory: Negative for cough, hemoptysis, SOB. Cardiology: Negative for chest pain, palpitations. GI:  Negative for constipation or diarrhea. : Negative for urinary frequency, dysuria, hematuria, nocturia. Skin: Negative for rash.   Hematology: Negative for easy bruising, blood clots. Musculo-skelatal: Negative for back pain, muscle pain, weakness. Neurologic: Negative for headaches, dizziness, vertigo, memory problems not related to HE. Psychology: Negative for anxiety, depression. FAMILY HISTORY:  The father  of lung cancer. The mother  of CVA. There is no family history of liver disease. SOCIAL HISTORY:  The patient is . The patient has 1 child. The patient stopped using tobacco products in . The patient consumes alcohol on social occasions never in excess. The patient has been abstinent from alcohol since . The patient used to work as . The patient has not worked since . PHYSICAL EXAMINATION:  Visit Vitals  /78 (BP 1 Location: Left upper arm, BP Patient Position: Sitting, BP Cuff Size: Small adult)   Pulse 93   Temp 97.3 °F (36.3 °C)   Resp 23   Ht 5' 2\" (1.575 m)   Wt 125 lb (56.7 kg)   SpO2 98%   BMI 22.86 kg/m²     General: No acute distress. Eyes: Sclera anicteric. ENT: No oral lesions. Thyroid normal.  Nodes: No adenopathy. Skin: No spider angiomata. No jaundice. No palmar erythema. Respiratory: Lungs clear to auscultation. Cardiovascular: Regular heart rate. No murmurs. No JVD. Abdomen: Soft non-tender. Liver size normal to percussion/palpation. Spleen not palpable. No obvious ascites. Extremities: No edema. No muscle wasting. No gross arthritic changes. Neurologic: Alert and oriented. Cranial nerves grossly intact. No asterixis.     LABORATORY STUDIES:  Liver [place] of [address] Units 10/5/2021 2021   WBC 4.6 - 13.2 K/uL 6.1 6.1   ANC 1.8 - 8.0 K/UL 4.1 3.8   HGB 12.0 - 16.0 g/dL 12.0 12.5    - 420 K/uL 273 319   INR 0.8 - 1.2    1.0   AST 10 - 38 U/L 22 21   ALT 13 - 56 U/L 26 30   Alk Phos 45 - 117 U/L 76 67   Bili, Total 0.2 - 1.0 MG/DL 0.3 0.3   Bili, Direct 0.0 - 0.2 MG/DL 0.1 0.1   Albumin 3.4 - 5.0 g/dL 3.9 4.4   BUN 7.0 - 18 MG/DL 9 11   Creat 0.6 - 1.3 MG/DL 0.53 (L) 0.65   Na 136 - 145 mmol/L 142 141   K 3.5 - 5.5 mmol/L 3.8 4.0   Cl 100 - 111 mmol/L 108 106   CO2 21 - 32 mmol/L 29 29   Glucose 74 - 99 mg/dL 111 (H) 90     SEROLOGIES:  9/2019. HBsurface antigen negative, anti-HBcore negative, anti-HBsurface positive    Serologies Latest Ref Rng & Units 2/17/2021 1/12/2016   Hep A Ab, Total Negative   Positive (A)    Hep B Surface Ag <1.00 Index  <0.10   Hep B Surface Ag Interp NEG    NEGATIVE   Hep C Ab 0.0 - 0.9 s/co ratio  <0.1   Ferritin 8 - 388 NG/    Iron % Saturation 20 - 50 % 28    OTILIA, IFA  Negative    ASMCA 0 - 19 Units 22 (H)    Ceruloplasmin 19.0 - 39.0 mg/dL 31.2    Alpha-1 antitrypsin level 101 - 187 mg/dL 121      LIVER HISTOLOGY:  4/2021. FibroScan performed at [address]. EkPa was 3.5. IQR/med 9%. . The results suggested a fibrosis level of F0. The CAP score suggests there is no significant hepatic steatosis. ENDOSCOPIC PROCEDURES:  Not available or performed    RADIOLOGY:  1/2021. Ultrasound of liver. Normal appearing liver. No liver mass lesions. OTHER TESTING:  Not available or performed    FOLLOW-UP:  All of the issues listed above in the Assessment and Plan were discussed with the patient. All questions were answered. The patient expressed a clear understanding of the above. [facility] 87 in 6 months for routine monitoring. Fibroscan at next visit.        Román Munroe, VINITA-BC  [address] [place] [address], [facility] [address]  [facility] Lynette [facility], [address]  [phone]

## 2022-03-18 PROBLEM — E11.9 TYPE II DIABETES MELLITUS (HCC): Status: ACTIVE | Noted: 2021-02-17

## 2022-03-18 PROBLEM — E78.00 HYPERCHOLESTEREMIA: Status: ACTIVE | Noted: 2021-02-17

## 2022-03-19 PROBLEM — R74.8 ELEVATED LIVER ENZYMES: Status: ACTIVE | Noted: 2021-02-17

## 2022-03-19 PROBLEM — I10 HYPERTENSION: Status: ACTIVE | Noted: 2021-02-17

## 2022-04-02 ASSESSMENT — VISUAL ACUITY
OS_SC: 20/20-1
OS_CC: J1
OD_CC: J2
OD_CC: J1
OS_GLARE: 20/200
OD_GLARE: 20/200
OS_CC: 20/50
OD_CC: 20/40
OD_SC: 20/25
OD_SC: 20/20
OS_CC: J2
OD_SC: 20/30
OS_GLARE: 20/100
OD_GLARE: 20/100
OD_CC: J2
OD_SC: 20/20
OS_SC: 20/25
OS_SC: 20/30
OS_CC: J1
OS_CC: J2
OS_SC: 20/20-2
OD_CC: J1

## 2022-04-02 ASSESSMENT — TONOMETRY
OS_IOP_MMHG: 13
OS_IOP_MMHG: 13
OD_IOP_MMHG: 13
OD_IOP_MMHG: 11
OS_IOP_MMHG: 12
OD_IOP_MMHG: 13
OS_IOP_MMHG: 12
OD_IOP_MMHG: 12

## 2022-04-11 ENCOUNTER — OFFICE VISIT (OUTPATIENT)
Dept: HEMATOLOGY | Age: 62
End: 2022-04-11
Payer: OTHER GOVERNMENT

## 2022-04-11 ENCOUNTER — HOSPITAL ENCOUNTER (OUTPATIENT)
Dept: LAB | Age: 62
Discharge: HOME | End: 2022-04-11
Payer: OTHER GOVERNMENT

## 2022-04-11 VITALS
WEIGHT: 124 LBS | TEMPERATURE: 97.6 F | HEIGHT: 62 IN | OXYGEN SATURATION: 97 % | DIASTOLIC BLOOD PRESSURE: 72 MMHG | SYSTOLIC BLOOD PRESSURE: 124 MMHG | BODY MASS INDEX: 22.82 KG/M2 | RESPIRATION RATE: 16 BRPM | HEART RATE: 77 BPM

## 2022-04-11 DIAGNOSIS — R74.8 ELEVATED LIVER ENZYMES: ICD-10-CM

## 2022-04-11 DIAGNOSIS — R74.8 ELEVATED LIVER ENZYMES: Primary | ICD-10-CM

## 2022-04-11 LAB
ALBUMIN SERPL-MCNC: 4 G/DL (ref 3.4–5)
ALBUMIN/GLOB SERPL: 1.2 {RATIO} (ref 0.8–1.7)
ALP SERPL-CCNC: 75 U/L (ref 45–117)
ALT SERPL-CCNC: 24 U/L (ref 13–56)
ANION GAP SERPL CALC-SCNC: 4 MMOL/L (ref 3–18)
AST SERPL-CCNC: 21 U/L (ref 10–38)
BASOPHILS # BLD: 0 K/UL (ref 0–0.1)
BASOPHILS NFR BLD: 1 % (ref 0–2)
BILIRUB DIRECT SERPL-MCNC: 0.1 MG/DL (ref 0–0.2)
BILIRUB SERPL-MCNC: 0.4 MG/DL (ref 0.2–1)
BUN SERPL-MCNC: 8 MG/DL (ref 7–18)
BUN/CREAT SERPL: 14 (ref 12–20)
CALCIUM SERPL-MCNC: 9.5 MG/DL (ref 8.5–10.1)
CHLORIDE SERPL-SCNC: 107 MMOL/L (ref 100–111)
CO2 SERPL-SCNC: 30 MMOL/L (ref 21–32)
CREAT SERPL-MCNC: 0.56 MG/DL (ref 0.6–1.3)
DIFFERENTIAL METHOD BLD: ABNORMAL
EOSINOPHIL # BLD: 0.1 K/UL (ref 0–0.4)
EOSINOPHIL NFR BLD: 2 % (ref 0–5)
ERYTHROCYTE [DISTWIDTH] IN BLOOD BY AUTOMATED COUNT: 11.9 % (ref 11.6–14.5)
GLOBULIN SER CALC-MCNC: 3.4 G/DL (ref 2–4)
GLUCOSE SERPL-MCNC: 89 MG/DL (ref 74–99)
HCT VFR BLD AUTO: 37.1 % (ref 35–45)
HGB BLD-MCNC: 11.9 G/DL (ref 12–16)
IMM GRANULOCYTES # BLD AUTO: 0 K/UL (ref 0–0.04)
IMM GRANULOCYTES NFR BLD AUTO: 0 % (ref 0–0.5)
LYMPHOCYTES # BLD: 1.6 K/UL (ref 0.9–3.6)
LYMPHOCYTES NFR BLD: 30 % (ref 21–52)
MCH RBC QN AUTO: 30.1 PG (ref 24–34)
MCHC RBC AUTO-ENTMCNC: 32.1 G/DL (ref 31–37)
MCV RBC AUTO: 93.9 FL (ref 78–100)
MONOCYTES # BLD: 0.4 K/UL (ref 0.05–1.2)
MONOCYTES NFR BLD: 7 % (ref 3–10)
NEUTS SEG # BLD: 3.3 K/UL (ref 1.8–8)
NEUTS SEG NFR BLD: 60 % (ref 40–73)
NRBC # BLD: 0 K/UL (ref 0–0.01)
NRBC BLD-RTO: 0 PER 100 WBC
PLATELET # BLD AUTO: 285 K/UL (ref 135–420)
PMV BLD AUTO: 10.4 FL (ref 9.2–11.8)
POTASSIUM SERPL-SCNC: 4 MMOL/L (ref 3.5–5.5)
PROT SERPL-MCNC: 7.4 G/DL (ref 6.4–8.2)
RBC # BLD AUTO: 3.95 M/UL (ref 4.2–5.3)
SODIUM SERPL-SCNC: 141 MMOL/L (ref 136–145)
WBC # BLD AUTO: 5.5 K/UL (ref 4.6–13.2)

## 2022-04-11 PROCEDURE — 99213 OFFICE O/P EST LOW 20 MIN: CPT | Performed by: NURSE PRACTITIONER

## 2022-04-11 PROCEDURE — 80076 HEPATIC FUNCTION PANEL: CPT

## 2022-04-11 PROCEDURE — 85025 COMPLETE CBC W/AUTO DIFF WBC: CPT

## 2022-04-11 PROCEDURE — 36415 COLL VENOUS BLD VENIPUNCTURE: CPT

## 2022-04-11 PROCEDURE — 80048 BASIC METABOLIC PNL TOTAL CA: CPT

## 2022-04-11 PROCEDURE — 91200 LIVER ELASTOGRAPHY: CPT | Performed by: NURSE PRACTITIONER

## 2022-04-11 NOTE — PROGRESS NOTES
[facility], MD, Salvador Hay, Abdifatah Temi Rene, [place]      Judeen Rands, PA-C Hermine Spatz, [facility]-BC     Virginia Choudhury, [facility]   EMMA Kuo-JARET Enriquez, [facility]       Yazdilshad Garber Raffaele De [address] 136    at [address], [facility] William Thompson  22.    [phone]    FAX: [address] [place] [address], [address]    [phone]    FAX: [phone]     Patient Care Team:  Hammad Aleman NP as PCP - General (Nurse Practitioner)  Marlon Ramos MD (Internal Medicine)      Problem List  Date Reviewed: 10/5/2021          Codes Class Noted    Hypertension ICD-10-CM: I10  ICD-9-CM: 401.9  2/17/2021        Elevated liver enzymes ICD-10-CM: R74.8  ICD-9-CM: 790.5  2/17/2021        Hypercholesteremia ICD-10-CM: E78.00  ICD-9-CM: 272.0  2/17/2021        Type II diabetes mellitus ([facility] ICD-10-CM: E11.9  ICD-9-CM: 250.00  2/17/2021              Franny Edward is being seen at [address] for management of elevated liver enzymes. The active problem list, all pertinent past medical history, medications, liver histology, radiologic findings, and laboratory findings related to the liver disorder were reviewed and discussed with the patient. The patient is a 62 y.o. female who was found to have elevated liver transaminases in 12/2019. Serologic evaluation for markers of chronic liver disease was positive for ASMA. Ultrasound of the liver was performed in 1/2020. The results of the imaging demonstrated a normal appearing liver. Assessment of liver fibrosis with Fibroscan was performed in the office today. The result was 2.7 kPa which correlates with no fibrosis.  The CAP score of 260 suggests hepatic steatosis. The patient has no symptoms which can be attributed to the liver disorder. The patient is not currently experiencing the following symptoms of liver disease:  fatigue, pain in the right side over the liver    The patient completes all daily activities without any functional limitations. ASSESSMENT AND PLAN:  Elevated liver enzymes  Intermittent elevation in liver transaminases which are now normal.    Will perform laboratory testing to monitor liver function and degree of liver injury. This included BMP, hepatic panel, CBC with platelet count, INR. Liver transaminases are now normal. ALP is normal. Liver function is normal. The platelet count is normal.    Based upon laboratory studies and imaging the patient does not appear to have significant liver injury. Serologic testing for causes of chronic liver disease was ordered. Result was positive for ASMA    The need to perform an assessment of liver fibrosis was discussed with the patient. The Fibroscan can assess liver fibrosis and determine if a patient has advanced fibrosis or cirrhosis without the need for liver biopsy. The Fibroscan can be repeated annually or as often as clinically indicated to assess for fibrosis progression and/or regression. Positive serology for autoimmune liver disease  The positive ASMA suggests that there the may be an underlying autoimmune liver disease. Given that the liver enzymes have returned to normal it is unlikely there is an autoimmune liver disorder. The patient does not appear to have an autoimmune liver disease. The positive autoimmune marker has no clinical significance at this time. Screening for Hepatocellular Carcinoma  HCC screening is not necessary if the patient has no evidence of cirrhosis.     Treatment of other medical problems in patients with chronic liver disease  There are no contraindications for the patient to take most medications that are necessary for treatment of other medical issues. Counseling for alcohol in patients with chronic liver disease  The patient was counseled regarding alcohol consumption and the effect of alcohol on chronic liver disease. The patient does not consume any significant amount of alcohol. Vaccinations   Vaccination for viral hepatitis A and B is not needed. The patient has serologic evidence of prior exposure or vaccination with immunity. Routine vaccinations against other bacterial and viral agents can be performed as indicated. Annual flu vaccination should be administered if indicated. ALLERGIES  Allergies   Allergen Reactions    Pcn [Penicillins] Rash       MEDICATIONS  Current Outpatient Medications   Medication Sig    omega-3 acid ethyl esters (LOVAZA) 1 gram capsule Take 2 g by mouth two (2) times a day.  multivitamin (ONE A DAY) tablet Take 1 Tablet by mouth daily.  metFORMIN (GLUCOPHAGE) 500 mg tablet Take  by mouth two (2) times daily (with meals).  cholecalciferol (VITAMIN D3) 1,000 unit tablet Take 1 Tab by mouth.  amLODIPine (NORVASC) 2.5 mg tablet Take 2.5 mg by mouth daily.  rosuvastatin (CRESTOR) 5 mg tablet Take 5 mg by mouth daily.  raloxifene (EVISTA) 60 mg tablet Take  by mouth daily.  PSYLLIUM HUSK/CALCIUM CARB (METAMUCIL PLUS CALCIUM PO) Take  by mouth.  ascorbic acid (VITAMIN C) 500 mg tablet Take  by mouth. No current facility-administered medications for this visit. SYSTEM REVIEW NOT RELATED TO LIVER DISEASE OR REVIEWED ABOVE:  Constitution systems: Negative for fever, chills, weight gain, weight loss. Eyes: Negative for visual changes. ENT: Negative for sore throat, painful swallowing. Respiratory: Negative for cough, hemoptysis, SOB. Cardiology: Negative for chest pain, palpitations. GI:  Negative for constipation or diarrhea. : Negative for urinary frequency, dysuria, hematuria, nocturia. Skin: Negative for rash.   Hematology: Negative for easy bruising, blood clots. Musculo-skelatal: Negative for back pain, muscle pain, weakness. Neurologic: Negative for headaches, dizziness, vertigo, memory problems not related to HE. Psychology: Negative for anxiety, depression. FAMILY HISTORY:  The father  of lung cancer. The mother  of CVA. There is no family history of liver disease. SOCIAL HISTORY:  The patient is . The patient has 1 child. The patient stopped using tobacco products in . The patient consumes alcohol on social occasions never in excess. The patient has been abstinent from alcohol since . The patient used to work as . The patient has not worked since . PHYSICAL EXAMINATION:  Visit Vitals  /72 (BP 1 Location: Left arm, BP Patient Position: Sitting, BP Cuff Size: Adult)   Pulse 77   Temp 97.6 °F (36.4 °C)   Resp 16   Ht 5' 2\" (1.575 m)   Wt 124 lb (56.2 kg)   SpO2 97%   BMI 22.68 kg/m²     General: No acute distress. Eyes: Sclera anicteric. ENT: No oral lesions. Thyroid normal.  Nodes: No adenopathy. Skin: No spider angiomata. No jaundice. No palmar erythema. Respiratory: Lungs clear to auscultation. Cardiovascular: Regular heart rate. No murmurs. No JVD. Abdomen: Soft non-tender. Liver size normal to percussion/palpation. Spleen not palpable. No obvious ascites. Extremities: No edema. No muscle wasting. No gross arthritic changes. Neurologic: Alert and oriented. Cranial nerves grossly intact. No asterixis.     LABORATORY STUDIES:  Liver [place] of [address] Units 10/5/2021 2021   WBC 4.6 - 13.2 K/uL 6.1 6.1   ANC 1.8 - 8.0 K/UL 4.1 3.8   HGB 12.0 - 16.0 g/dL 12.0 12.5    - 420 K/uL 273 319   INR 0.8 - 1.2    1.0   AST 10 - 38 U/L 22 21   ALT 13 - 56 U/L 26 30   Alk Phos 45 - 117 U/L 76 67   Bili, Total 0.2 - 1.0 MG/DL 0.3 0.3   Bili, Direct 0.0 - 0.2 MG/DL 0.1 0.1   Albumin 3.4 - 5.0 g/dL 3.9 4.4   BUN 7.0 - 18 MG/DL 9 11   Creat 0.6 - 1.3 MG/DL 0.53 (L) 0.65   Na 136 - 145 mmol/L 142 141   K 3.5 - 5.5 mmol/L 3.8 4.0   Cl 100 - 111 mmol/L 108 106   CO2 21 - 32 mmol/L 29 29   Glucose 74 - 99 mg/dL 111 (H) 90     SEROLOGIES:  9/2019. HBsurface antigen negative, anti-HBcore negative, anti-HBsurface positive    Serologies Latest Ref Rng & Units 2/17/2021 1/12/2016   Hep A Ab, Total Negative   Positive (A)    Hep B Surface Ag <1.00 Index  <0.10   Hep B Surface Ag Interp NEG    NEGATIVE   Hep C Ab 0.0 - 0.9 s/co ratio  <0.1   Ferritin 8 - 388 NG/    Iron % Saturation 20 - 50 % 28    OTILIA, IFA  Negative    ASMCA 0 - 19 Units 22 (H)    Ceruloplasmin 19.0 - 39.0 mg/dL 31.2    Alpha-1 antitrypsin level 101 - 187 mg/dL 121      LIVER HISTOLOGY:  4/2021. FibroScan performed at [facility]. EkPa was 3.5. IQR/med 9%. . The results suggested a fibrosis level of F0. The CAP score suggests there is no significant hepatic steatosis. 4/2022. FibroScan performed at [facility]. EkPa was 2.7. IQR/med 19%. . The results suggested a fibrosis level of F0. The CAP score suggests there is hepatic steatosis. ENDOSCOPIC PROCEDURES:  Not available or performed    RADIOLOGY:  1/2021. Ultrasound of liver. Normal appearing liver. No liver mass lesions. OTHER TESTING:  Not available or performed    FOLLOW-UP:  All of the issues listed above in the Assessment and Plan were discussed with the patient. All questions were answered. The patient expressed a clear understanding of the above. Follow-up Hai Lucho [address] 32 in 1 year for Fibroscan.        Dolly Godinez, VINITA-BC  [address] Liver [place] [address] [address]  [place], [address] [place] [address] - [address]  [phone]

## 2023-04-17 ENCOUNTER — OFFICE VISIT (OUTPATIENT)
Age: 63
End: 2023-04-17
Payer: OTHER GOVERNMENT

## 2023-04-17 VITALS
DIASTOLIC BLOOD PRESSURE: 68 MMHG | BODY MASS INDEX: 23.19 KG/M2 | SYSTOLIC BLOOD PRESSURE: 106 MMHG | TEMPERATURE: 97.6 F | HEIGHT: 62 IN | HEART RATE: 88 BPM | OXYGEN SATURATION: 98 % | WEIGHT: 126 LBS

## 2023-04-17 DIAGNOSIS — R74.8 ABNORMAL LEVELS OF OTHER SERUM ENZYMES: Primary | ICD-10-CM

## 2023-04-17 PROCEDURE — 3078F DIAST BP <80 MM HG: CPT | Performed by: NURSE PRACTITIONER

## 2023-04-17 PROCEDURE — 99213 OFFICE O/P EST LOW 20 MIN: CPT | Performed by: NURSE PRACTITIONER

## 2023-04-17 PROCEDURE — 91200 LIVER ELASTOGRAPHY: CPT | Performed by: NURSE PRACTITIONER

## 2023-04-17 PROCEDURE — 3074F SYST BP LT 130 MM HG: CPT | Performed by: NURSE PRACTITIONER

## 2023-04-17 NOTE — PROGRESS NOTES
ratio  <0.1   Ferritin 8 - 388 NG/    Iron % Saturation 20 - 50 % 28    RAMIRO, IFA  Negative    ASMCA 0 - 19 Units 22 (H)    Ceruloplasmin 19.0 - 39.0 mg/dL 31.2    Alpha-1 antitrypsin level 101 - 187 mg/dL 121      LIVER HISTOLOGY:  4/2021. FibroScan performed at [facility]. EkPa was 3.5. IQR/med 9%. . The results suggested a fibrosis level of F0. The CAP score suggests there is no significant hepatic steatosis. 4/2022. FibroScan performed at [facility]. EkPa was 2.7. IQR/med 19%. . The results suggested a fibrosis level of F0. The CAP score suggests there is hepatic steatosis. 4/2023. FibroScan performed at [facility]. EkPa was 2.7. IQR/med 14%. . The results suggested a fibrosis level of F0. The CAP score suggests there is hepatic steatosis. ENDOSCOPIC PROCEDURES:  Not available or performed    RADIOLOGY:  1/2021. Ultrasound of liver. Normal appearing liver. No liver mass lesions. OTHER TESTING:  Not available or performed    FOLLOW-UP:  All of the issues listed above in the Assessment and Plan were discussed with the patient. All questions were answered. The patient expressed a clear understanding of the above. Follow-up Berry Mace 32 in 1 year for Fibroscan.        Desire Zuluaga, AGPCNP-BC  [address] Liver [place] [address] [address]  [place], [address] [place] [address] Street [address] [phone]

## 2023-07-12 ENCOUNTER — HOSPITAL ENCOUNTER (OUTPATIENT)
Facility: HOSPITAL | Age: 63
Setting detail: RECURRING SERIES
Discharge: HOME | End: 2023-07-15
Payer: OTHER GOVERNMENT

## 2023-07-12 PROCEDURE — 97110 THERAPEUTIC EXERCISES: CPT

## 2023-07-12 PROCEDURE — 97161 PT EVAL LOW COMPLEX 20 MIN: CPT

## 2023-07-12 NOTE — PROGRESS NOTES
PHYSICAL / OCCUPATIONAL THERAPY - DAILY TREATMENT NOTE (updated )  For Eval visit    Patient Name: Chiquis Noriega    Date: 2023    : 1960  Insurance: Payor:  EAST / Plan:  EAST / Product Type: *No Product type* /      Patient  verified yes     Visit #   Current / Total 1 12-16   Time   In / Out 12:20 1:12   Pain   In / Out 1 1   Subjective Functional Status/Changes: See POC; pt reports N/T to toes 90% improved after session     TREATMENT AREA =  see POC    OBJECTIVE       40 min   Eval - untimed                      Therapeutic Procedures: Tx Min Billable or 1:1 Min (if diff from Tx Min) Procedure, Rationale, Specifics   10  44421 Therapeutic Exercise (timed):  increase ROM, strength, coordination, balance, and proprioception to improve patient's ability to progress to PLOF and address remaining functional goals. (see flow sheet as applicable)     Details if applicable:       2  91318 Self Care/Home Management (timed):  improve patient knowledge and understanding of pain reducing techniques, positioning, posture/ergonomics, activity modification, diagnosis/prognosis, and physical therapy expectations, procedures and progression  to improve patient's ability to progress to PLOF and address remaining functional goals.   (see flow sheet as applicable)     Details if applicable:            Details if applicable:            Details if applicable:            Details if applicable:       [facility] Totals Reminder: bill using total billable min of TIMED therapeutic procedures (example: do not include dry needle or estim unattended, both untimed codes, in totals to left)  8-22 min = 1 unit; 23-37 min = 2 units; 38-52 min = 3 units; 53-67 min = 4 units; 68-82 min = 5 units   Total Total     [x]  Patient Education billed concurrently with other procedures   [x] Review HEP    [] Progressed/Changed HEP, detail:    [] Other detail:       Objective Information/Functional

## 2023-07-12 NOTE — PROGRESS NOTES
[address] [place] [address] PHYSICAL THERAPY  [address] [address] [facility]  Phone: [phone]   [phone]  Plan of Care / Statement of Necessity for Physical Therapy Services     Patient Name: Catrina Quan : 1960   Medical   Diagnosis: Other low back pain [M54.59] Treatment Diagnosis:   M54.59  OTHER LOWER BACK PAIN    Onset Date: Chronic   Exacerbation 22     Referral Source: Ysabel Hanson* Start of Care [facility]): 2023   Prior Hospitalization: See medical history Provider #: [id number]   Prior Level of Function: Chronic limitations to prolonged sitting/lifting due to LBP. bryan every night (8 min)   Comorbidities: DM II, HTN, elevated HR, h/o breast CA     Assessment / key information:  Pt is a 65y.o. year old female with subjective complaints of LBP. STANLEY: Pt states LBP is chronic without known cause/injury. Pt states her back pain is intermittent and flares up with weather changes (I.e. with season change; not with rainy weather) or static postures (sitting too long). She hsas been seen by PCP who referred to neuro who \"suspects a pinched nerve\"; he did an MRI on c/s but not L/s; She was told a long time ago she had \"seasonal arthritis\" but neuro doctor did not dx with arthritis. Pt endorses N/T to b/l feet and and fingertips; occasionally toes drift in painfully; states cleared for vascular issues. Red Flags: Endorses:  Saddle region paresthesias, change to pain with cough (hurts more), bladder/bowel incontinence, unremitting night pain  Denies:  Unexplained weight changes, Saddle region paresthesias, bladder/bowel incontinence,     Current pain is rated as 0 to 5/10; and described as intermittent. Localized to L/R hips and low back, tingling of feet+hands.   Current functional limitations: transfers (OOB), walking, sitting* (increased pain especially on soft surfaces or when slumped), bryan   Better with: standing  FOTO score= 62/100

## 2023-07-14 ENCOUNTER — HOSPITAL ENCOUNTER (OUTPATIENT)
Facility: HOSPITAL | Age: 63
Setting detail: RECURRING SERIES
Discharge: HOME | End: 2023-07-17
Payer: OTHER GOVERNMENT

## 2023-07-14 PROCEDURE — 97110 THERAPEUTIC EXERCISES: CPT

## 2023-07-14 PROCEDURE — G0283 ELEC STIM OTHER THAN WOUND: HCPCS

## 2023-07-14 PROCEDURE — 97140 MANUAL THERAPY 1/> REGIONS: CPT

## 2023-07-14 PROCEDURE — 97112 NEUROMUSCULAR REEDUCATION: CPT

## 2023-07-18 ENCOUNTER — HOSPITAL ENCOUNTER (OUTPATIENT)
Facility: HOSPITAL | Age: 63
Setting detail: RECURRING SERIES
Discharge: HOME | End: 2023-07-21
Payer: OTHER GOVERNMENT

## 2023-07-18 PROCEDURE — 97140 MANUAL THERAPY 1/> REGIONS: CPT

## 2023-07-18 PROCEDURE — 97112 NEUROMUSCULAR REEDUCATION: CPT

## 2023-07-18 PROCEDURE — G0283 ELEC STIM OTHER THAN WOUND: HCPCS

## 2023-07-18 PROCEDURE — 97110 THERAPEUTIC EXERCISES: CPT

## 2023-07-18 NOTE — PROGRESS NOTES
Min (if diff from Tx Min) Procedure, Rationale, Specifics   20  70125 Therapeutic Exercise (timed):  increase ROM, strength, coordination, balance, and proprioception to improve patient's ability to progress to PLOF and address remaining functional goals. (see flow sheet as applicable)     Details if applicable:       18  03582 Neuromuscular Re-Education (timed):  improve balance, coordination, kinesthetic sense, posture, core stability and proprioception to improve patient's ability to develop conscious control of individual muscles and awareness of position of extremities in order to progress to PLOF and address remaining functional goals. (see flow sheet as applicable)     Details if applicable:     10  10827 Manual Therapy (timed):  decrease pain, increase ROM, increase tissue extensibility, and decrease trigger points to improve patient's ability to progress to PLOF and address remaining functional goals. The manual therapy interventions were performed at a separate and distinct time from the therapeutic activities interventions . (see flow sheet as applicable)     Details if applicable:    -STM R mid-lower l/s paraspinals, upper glutes. Manual R piriformis release          Details if applicable:            Details if applicable:     50  MC BC Totals Reminder: bill using total billable min of TIMED therapeutic procedures (example: do not include dry needle or estim unattended, both untimed codes, in totals to left)  8-22 min = 1 unit; 23-37 min = 2 units; 38-52 min = 3 units; 53-67 min = 4 units; 68-82 min = 5 units   Total Total     [x]  Patient Education billed concurrently with other procedures   [x] Review HEP    [] Progressed/Changed HEP, detail:    [] Other detail:       Objective Information/Functional Measures/Assessment  -pt able to perform quadruped exercises today which were previously held due to intolerance indicating good pain reduction;  Fair l/s stability with vc/mc's to avoid excessive lateral

## 2023-07-21 ENCOUNTER — HOSPITAL ENCOUNTER (OUTPATIENT)
Facility: HOSPITAL | Age: 63
Setting detail: RECURRING SERIES
Discharge: HOME | End: 2023-07-24
Payer: OTHER GOVERNMENT

## 2023-07-21 PROCEDURE — 97140 MANUAL THERAPY 1/> REGIONS: CPT

## 2023-07-21 PROCEDURE — 97530 THERAPEUTIC ACTIVITIES: CPT

## 2023-07-21 PROCEDURE — 97110 THERAPEUTIC EXERCISES: CPT

## 2023-07-21 NOTE — PROGRESS NOTES
PHYSICAL / OCCUPATIONAL THERAPY - DAILY TREATMENT NOTE (updated )    Patient Name: Dariel Madden    Date: 2023    : 1960  Insurance: Payor:  EAST / Plan: [employer] EAST / Product Type: *No Product type* /      Patient  verified Yes     Visit #   Current / Total 4 12-16   Time   In / Out 10:18 11:09   Pain   In / Out 1/10 1/10   Subjective Functional Status/Changes: I don't have that much pain in my back today, but I feel like the pain my back has been switching sides from the right to the left side for no particular reason at all. TREATMENT AREA =  Other low back pain [M54.59]    OBJECTIVE    Modalities Rationale:     decrease pain and increase tissue extensibility to improve patient's ability to progress to PLOF and address remaining functional goals. min [] Estim Unattended, type/location:                                      []  w/ice    []  w/heat    min [] Estim Attended, type/location:                                     []  w/US     []  w/ice    []  w/heat    []  TENS insruct      min []  Mechanical Traction: type/lbs                   []  pro   []  sup   []  int   []  cont    []  before manual    []  after manual    min []  Ultrasound, settings/location:      min []  Iontophoresis w/ dexamethasone, location:                                               []  take home patch       []  in clinic   10 min  unbill []  Ice     [x]  Heat    location/position: Lumbar/upper glutes in prone     min []  Paraffin,  details:     min []  Vasopneumatic Device, press/temp:     min []  Abilio Bakerix / Aureliano Beau: If using vaso (only need to measure limb vaso being performed on)      pre-treatment girth :       post-treatment girth :       measured at (landmark location) :      min []  Other:    Skin assessment post-treatment (if applicable):    [x]  intact    [x]  redness- no adverse reaction                 []redness - adverse reaction:         Therapeutic Procedures:     Tx Min Billable or

## 2023-07-25 ENCOUNTER — HOSPITAL ENCOUNTER (OUTPATIENT)
Facility: HOSPITAL | Age: 63
Setting detail: RECURRING SERIES
Discharge: HOME | End: 2023-07-28
Payer: OTHER GOVERNMENT

## 2023-07-25 PROCEDURE — 97530 THERAPEUTIC ACTIVITIES: CPT

## 2023-07-25 PROCEDURE — 97140 MANUAL THERAPY 1/> REGIONS: CPT

## 2023-07-25 PROCEDURE — 97110 THERAPEUTIC EXERCISES: CPT

## 2023-07-25 NOTE — PROGRESS NOTES
PHYSICAL / OCCUPATIONAL THERAPY - DAILY TREATMENT NOTE (updated )    Patient Name: Michelle Harris    Date: 2023    : 1960  Insurance: Payor: [employer] EAST / Plan: [employer] EAST / Product Type: *No Product type* /      Patient  verified Yes     Visit #   Current / Total 5 12-16   Time   In / Out 10:20 11:15   Pain   In / Out 1/10 1/10   Subjective Functional Status/Changes: Pt reports pain in L gluteal area      TREATMENT AREA =  Other low back pain [M54.59]    OBJECTIVE    Modalities Rationale:     decrease pain and increase tissue extensibility to improve patient's ability to progress to PLOF and address remaining functional goals. min [] Estim Unattended, type/location:                                      []  w/ice    []  w/heat    min [] Estim Attended, type/location:                                     []  w/US     []  w/ice    []  w/heat    []  TENS insruct      min []  Mechanical Traction: type/lbs                   []  pro   []  sup   []  int   []  cont    []  before manual    []  after manual    min []  Ultrasound, settings/location:      min []  Iontophoresis w/ dexamethasone, location:                                               []  take home patch       []  in clinic   10 min  unbill []  Ice     [x]  Heat    location/position: Lumbar/upper glutes in prone     min []  Paraffin,  details:     min []  Vasopneumatic Device, press/temp:     min []  Costella Boas / Tura Given: If using vaso (only need to measure limb vaso being performed on)      pre-treatment girth :       post-treatment girth :       measured at (landmark location) :      min []  Other:    Skin assessment post-treatment (if applicable):    [x]  intact    [x]  redness- no adverse reaction                 []redness - adverse reaction:         Therapeutic Procedures:     Tx Min Billable or 1:1 Min (if diff from Tx Min) Procedure, Rationale, Specifics   23  06681 Therapeutic Exercise (timed):  increase ROM, strength,

## 2023-07-28 ENCOUNTER — APPOINTMENT (OUTPATIENT)
Facility: HOSPITAL | Age: 63
End: 2023-07-28
Payer: OTHER GOVERNMENT

## 2023-08-01 ENCOUNTER — APPOINTMENT (OUTPATIENT)
Facility: HOSPITAL | Age: 63
End: 2023-08-01
Payer: OTHER GOVERNMENT

## 2023-08-04 ENCOUNTER — APPOINTMENT (OUTPATIENT)
Facility: HOSPITAL | Age: 63
End: 2023-08-04
Payer: OTHER GOVERNMENT

## 2023-08-09 ENCOUNTER — APPOINTMENT (OUTPATIENT)
Facility: HOSPITAL | Age: 63
End: 2023-08-09
Payer: OTHER GOVERNMENT

## 2023-08-23 NOTE — PROGRESS NOTES
[id number]  [address] [address] [place]  Phone: [phone]   [phone]  PHYSICAL THERAPY PROGRESS NOTE  Patient Name: Akshat London : 1960   Treatment/Medical Diagnosis: Other low back pain [M54.59]   Referral Source: Jackie Finder*     Date of Initial Visit: 23 Attended Visits: 6 Missed Visits: 0     SUMMARY OF TREATMENT  Physical therapy has consisted of therapeutic exercises for increased core/LE strength, ROM, and flexibility. Therapeutic activities performed to improve functional activities, model real life movements and performance specific to the patient's need with supervision to return the patient to their PLOF. Neuromuscular Re-ed performed to improve coordination, improve mm activation, improve proprioception to improve the patient's ability to perform ADL/IADL's. Manual therapy PRN for decreased muscle hypertonicity and increased ROM/flexibility. Cold pack/Estim provided PRN for palliative. Pt education provided regarding HEP. CURRENT STATUS  Pt has been seen for 6 sessions of skilled PT for dx: LBP. Pt has not been in therapy for 29 days and returns today for the first time stating that \"I've just been doing the exercises at home\". Pt states she returned to PCP about a week ago for a regular f/u and she had acute LBP exacerbation; \"I could barely walk\". Pt was given Lidocaine and pain medication which provided some relief. Pt reports she has pending NCV testing . Denies saddle region paresthesias    % improvement: 50% improvement in tingling to b/left MT ridge. States when her back hurts the N/T travels down posterior BLE (usually just one at a time).    Max pain 10/10 (left low back) without any contributing factor  Avg pain 1/10  Min pain 1/10    Current improvements: Pt reporting decreased intensity of tingling to LE's  Remaining functional limitations:  transfers (OOB), walking,
= 4 units; 68-82 min = 5 units   Total Total     [x]  Patient Education billed concurrently with other procedures   [] Review HEP    [] Progressed/Changed HEP, detail:    [] Other detail:       Objective Information/Functional Measures/Assessment  See PN    Patient will continue to benefit from skilled PT / OT services to modify and progress therapeutic interventions, analyze and address functional mobility deficits, analyze and address ROM deficits, analyze and address strength deficits, analyze and address soft tissue restrictions, analyze and cue for proper movement patterns, analyze and modify for postural abnormalities, and instruct in home and community integration to address functional deficits and attain remaining goals.     Progress toward goals / Updated goals:  [x]  See Progress Note/Recertification        Next PN/ RC due 9/24/23  Auth due 9/26/23    PLAN  Yes  Continue plan of care  [x]  Upgrade activities as tolerated  []  Discharge due to :  []  Other:    Yobani Muñoz, PT    8/23/2023    12:30 PM    Future Appointments   Date Time Provider [address]   9/1/2023  8:20 AM Peggye Fonder, PT [facility]   9/8/2023  9:00 AM Peggye Fonder, PT Devin Scott [facility]   9/13/2023 11:40 AM Peggye Fonder, PT [facility]   9/15/2023  9:00 AM Peggye Fonder, PT [facility]   9/20/2023 10:20 AM Peggye Fonder, PT [facility]   9/22/2023  9:40 AM Peggye Fonder, PT [facility]   9/25/2023 11:00 AM Peggye Fonder, PT [facility]   9/29/2023 11:00 AM Peggye Fonder, PT [facility]   4/22/2024 10:40 AM Jonathan Mariee APRN - [facility]

## 2023-08-24 ENCOUNTER — HOSPITAL ENCOUNTER (OUTPATIENT)
Facility: HOSPITAL | Age: 63
Setting detail: RECURRING SERIES
Discharge: HOME | End: 2023-08-27
Payer: OTHER GOVERNMENT

## 2023-08-24 PROCEDURE — 97110 THERAPEUTIC EXERCISES: CPT

## 2023-08-24 PROCEDURE — 97530 THERAPEUTIC ACTIVITIES: CPT

## 2023-08-24 PROCEDURE — 97112 NEUROMUSCULAR REEDUCATION: CPT

## 2023-08-30 ENCOUNTER — APPOINTMENT (OUTPATIENT)
Facility: HOSPITAL | Age: 63
End: 2023-08-30
Payer: OTHER GOVERNMENT

## 2023-09-01 ENCOUNTER — HOSPITAL ENCOUNTER (OUTPATIENT)
Facility: HOSPITAL | Age: 63
Setting detail: RECURRING SERIES
Discharge: HOME | End: 2023-09-04
Payer: OTHER GOVERNMENT

## 2023-09-01 PROCEDURE — 97112 NEUROMUSCULAR REEDUCATION: CPT

## 2023-09-01 PROCEDURE — 97530 THERAPEUTIC ACTIVITIES: CPT

## 2023-09-01 PROCEDURE — 97110 THERAPEUTIC EXERCISES: CPT

## 2023-09-01 NOTE — PROGRESS NOTES
PHYSICAL / OCCUPATIONAL THERAPY - DAILY TREATMENT NOTE (updated )    Patient Name: Norris Garcia    Date: 2023    : 1960  Insurance: Payor:  EAST / Plan: [employer] EAST / Product Type: *No Product type* /      Patient  verified Yes     Visit #   Current / Total 7 14   Time   In / Out 8:19 9:05   Pain   In / Out 1- LBP  5/10-c/o N/T to b/l toes 0.5- LBP  2.5/10- N/T to toes   Subjective Functional Status/Changes: Pt states she does her HEP 1 x/day usually at night. States it helps decrease her radicular sx's. TREATMENT AREA =  Other low back pain [M54.59]    OBJECTIVE        Therapeutic Procedures: Tx Min Billable or 1:1 Min (if diff from Tx Min) Procedure, Rationale, Specifics   26  68751 Therapeutic Exercise (timed):  increase ROM, strength, coordination, balance, and proprioception to improve patient's ability to progress to PLOF and address remaining functional goals. (see flow sheet as applicable)     Details if applicable:       10  40043 Therapeutic Activity (timed):  use of dynamic activities replicating functional movements to increase ROM, strength, coordination, balance, and proprioception in order to improve patient's ability to progress to PLOF and address remaining functional goals. (see flow sheet as applicable)     Details if applicable:     10  04190 Neuromuscular Re-Education (timed):  improve balance, coordination, kinesthetic sense, posture, core stability and proprioception to improve patient's ability to develop conscious control of individual muscles and awareness of position of extremities in order to progress to PLOF and address remaining functional goals.  (see flow sheet as applicable)            Details if applicable:            Details if applicable:     55  [address] [facility] [address] Totals Reminder: bill using total billable min of TIMED therapeutic procedures (example: do not include dry needle or estim unattended, both untimed codes, in totals to left)  8-22 min = 1

## 2023-09-08 ENCOUNTER — APPOINTMENT (OUTPATIENT)
Facility: HOSPITAL | Age: 63
End: 2023-09-08
Payer: OTHER GOVERNMENT

## 2023-09-13 ENCOUNTER — APPOINTMENT (OUTPATIENT)
Facility: HOSPITAL | Age: 63
End: 2023-09-13
Payer: OTHER GOVERNMENT

## 2023-09-15 ENCOUNTER — APPOINTMENT (OUTPATIENT)
Facility: HOSPITAL | Age: 63
End: 2023-09-15
Payer: OTHER GOVERNMENT

## 2023-09-20 ENCOUNTER — APPOINTMENT (OUTPATIENT)
Facility: HOSPITAL | Age: 63
End: 2023-09-20
Payer: OTHER GOVERNMENT

## 2023-09-22 ENCOUNTER — APPOINTMENT (OUTPATIENT)
Facility: HOSPITAL | Age: 63
End: 2023-09-22
Payer: OTHER GOVERNMENT

## 2023-09-25 ENCOUNTER — APPOINTMENT (OUTPATIENT)
Facility: HOSPITAL | Age: 63
End: 2023-09-25
Payer: OTHER GOVERNMENT

## 2023-09-28 ENCOUNTER — APPOINTMENT (OUTPATIENT)
Facility: HOSPITAL | Age: 63
End: 2023-09-28
Payer: OTHER GOVERNMENT

## 2023-09-29 ENCOUNTER — APPOINTMENT (OUTPATIENT)
Facility: HOSPITAL | Age: 63
End: 2023-09-29
Payer: OTHER GOVERNMENT

## 2024-04-22 ENCOUNTER — OFFICE VISIT (OUTPATIENT)
Age: 64
End: 2024-04-22
Payer: OTHER GOVERNMENT

## 2024-04-22 VITALS
SYSTOLIC BLOOD PRESSURE: 146 MMHG | DIASTOLIC BLOOD PRESSURE: 77 MMHG | HEIGHT: 62 IN | BODY MASS INDEX: 23.92 KG/M2 | WEIGHT: 130 LBS | HEART RATE: 82 BPM | OXYGEN SATURATION: 98 %

## 2024-04-22 DIAGNOSIS — K75.81 METABOLIC DYSFUNCTION-ASSOCIATED STEATOHEPATITIS (MASH): Primary | ICD-10-CM

## 2024-04-22 PROCEDURE — 99213 OFFICE O/P EST LOW 20 MIN: CPT | Performed by: NURSE PRACTITIONER

## 2024-04-22 PROCEDURE — 3078F DIAST BP <80 MM HG: CPT | Performed by: NURSE PRACTITIONER

## 2024-04-22 PROCEDURE — 3077F SYST BP >= 140 MM HG: CPT | Performed by: NURSE PRACTITIONER

## 2024-04-22 PROCEDURE — 91200 LIVER ELASTOGRAPHY: CPT | Performed by: NURSE PRACTITIONER

## 2024-04-22 RX ORDER — ASPIRIN 81 MG/1
81 TABLET ORAL DAILY
COMMUNITY
Start: 2022-11-19

## 2024-04-22 ASSESSMENT — PATIENT HEALTH QUESTIONNAIRE - PHQ9
SUM OF ALL RESPONSES TO PHQ QUESTIONS 1-9: 0
1. LITTLE INTEREST OR PLEASURE IN DOING THINGS: NOT AT ALL
SUM OF ALL RESPONSES TO PHQ9 QUESTIONS 1 & 2: 0
2. FEELING DOWN, DEPRESSED OR HOPELESS: NOT AT ALL
SUM OF ALL RESPONSES TO PHQ QUESTIONS 1-9: 0

## 2024-04-22 NOTE — PROGRESS NOTES
Yasmin Garza presents today for   Chief Complaint   Patient presents with    1 Year Follow Up       Is someone accompanying this pt? No    Is the patient using any DME equipment during OV? No    Depression Screenin/22/2024    10:38 AM 2022     9:30 AM   PHQ-9 Questionaire   Little interest or pleasure in doing things 0 0   Feeling down, depressed, or hopeless 0 0   PHQ-9 Total Score 0 0         2024    10:38 AM 2022     9:30 AM 10/5/2021    10:10 AM 2021     9:52 AM   PHQ Scores   PHQ2 Score 0 0     PHQ2 Score   0 0   PHQ9 Score 0 0         Learning Assessment:  No question data found.    Abuse Screening:       No data to display                Fall Risk       No data to display                OPIOID RISK TOOL       No data to display                Coordination of Care:  1. Have you been to the ER, urgent care clinic since your last visit? No  Hospitalized since your last visit? No      
is no family history of liver disease.      SOCIAL HISTORY:  The patient is .    The patient has 1 child.    The patient stopped using tobacco products in 1988.    The patient consumes alcohol on social occasions never in excess.    The patient has been abstinent from alcohol since 2015.    The patient used to work as .    The patient has not worked since 2015.      PHYSICAL EXAMINATION:  Visit Vitals  BP (!) 146/77   Pulse 82   Ht 1.575 m (5' 2\")   Wt 59 kg (130 lb)   SpO2 98%   BMI 23.78 kg/m²     General: No acute distress.   Eyes: Sclera anicteric.   ENT: No oral lesions.  Thyroid normal.  Nodes: No adenopathy.   Skin: No spider angiomata.  No jaundice.  No palmar erythema.  Respiratory: Lungs clear to auscultation.   Cardiovascular: Regular heart rate.  No murmurs.  No JVD.  Abdomen: Soft non-tender.  Liver size normal to percussion/palpation.  Spleen not palpable. No obvious ascites.  Extremities: No edema.  No muscle wasting.  No gross arthritic changes.  Neurologic: Alert and oriented.  Cranial nerves grossly intact.  No asterixis.    LABORATORY STUDIES:  [facility] Latest Ref Rng & Units 4/11/2022   WBC 4.6 - 13.2 K/uL 5.5   ANC 1.8 - 8.0 K/UL 3.3   HGB 12.0 - 16.0 g/dL 11.9 (L)    - 420 K/uL 285   AST 10 - 38 U/L 21   ALT 13 - 56 U/L 24   Alk Phos 45 - 117 U/L 75   Bili, Total 0.2 - 1.0 MG/DL 0.4   Bili, Direct 0.0 - 0.2 MG/DL 0.1   Albumin 3.4 - 5.0 g/dL 4.0   BUN 7.0 - 18 MG/DL 8   Creat 0.6 - 1.3 MG/DL 0.56 (L)   Na 136 - 145 mmol/L 141   K 3.5 - 5.5 mmol/L 4.0   Cl 100 - 111 mmol/L 107   CO2 21 - 32 mmol/L 30   Glucose 74 - 99 mg/dL 89     SEROLOGIES:  9/2019.  HBsurface antigen negative, anti-HBcore negative, anti-HBsurface positive    Serologies Latest Ref Rng & Units 2/17/2021 1/12/2016   Hep A Ab, Total Negative   Positive (A)    Hep B Surface Ag <1.00 Index  <0.10   Hep B Surface Ag Interp NEG    NEGATIVE   Hep C Ab 0.0 - 0.9 s/co ratio

## 2025-04-21 ENCOUNTER — OFFICE VISIT (OUTPATIENT)
Age: 65
End: 2025-04-21
Payer: OTHER GOVERNMENT

## 2025-04-21 VITALS
RESPIRATION RATE: 18 BRPM | HEART RATE: 88 BPM | BODY MASS INDEX: 23.74 KG/M2 | DIASTOLIC BLOOD PRESSURE: 72 MMHG | SYSTOLIC BLOOD PRESSURE: 119 MMHG | WEIGHT: 129 LBS | HEIGHT: 62 IN | TEMPERATURE: 98.2 F | OXYGEN SATURATION: 97 %

## 2025-04-21 DIAGNOSIS — R74.8 ELEVATED LIVER ENZYMES: Primary | ICD-10-CM

## 2025-04-21 PROCEDURE — 91200 LIVER ELASTOGRAPHY: CPT | Performed by: NURSE PRACTITIONER

## 2025-04-21 PROCEDURE — 99214 OFFICE O/P EST MOD 30 MIN: CPT | Performed by: NURSE PRACTITIONER

## 2025-04-21 PROCEDURE — 3074F SYST BP LT 130 MM HG: CPT | Performed by: NURSE PRACTITIONER

## 2025-04-21 PROCEDURE — 3078F DIAST BP <80 MM HG: CPT | Performed by: NURSE PRACTITIONER

## 2025-04-21 RX ORDER — ROSUVASTATIN CALCIUM 20 MG/1
20 TABLET, COATED ORAL DAILY
COMMUNITY
Start: 2025-03-18

## 2025-04-21 RX ORDER — FERROUS SULFATE 325(65) MG
325 TABLET ORAL EVERY OTHER DAY
COMMUNITY
Start: 2024-10-15

## 2025-04-21 NOTE — PROGRESS NOTES
\"Have you been to the ER, urgent care clinic since your last visit?  Hospitalized since your last visit?\"    NO    “Have you seen or consulted any other health care providers outside our system since your last visit?”    NO               
performed at [facility]. EkPa was 2.2. IQR/med 8%. . The results suggested a fibrosis level of F0. The CAP score suggests there is hepatic steatosis.      4/2025. FibroScan performed at [facility]. EkPa was 2.9. IQR/med 9%. .   The results suggested a fibrosis level of F0. The CAP score suggests there is hepatic steatosis.      ENDOSCOPIC PROCEDURES:  Not available or performed    RADIOLOGY:  1/2021. Ultrasound of liver. Normal appearing liver. No liver mass lesions.    OTHER TESTING:  Not available or performed    FOLLOW-UP:  All of the issues listed above in the Assessment and Plan were discussed with the patient.  All questions were answered.  The patient expressed a clear understanding of the above.    Follow-up [facility] in 1 year for routine monitoring which may include a Fibroscan.       Janneth Harris AGPCNP-BC  [facility]  [address], [address]  [place]  [address] [phone]